# Patient Record
Sex: MALE | Race: OTHER | Employment: FULL TIME | URBAN - METROPOLITAN AREA
[De-identification: names, ages, dates, MRNs, and addresses within clinical notes are randomized per-mention and may not be internally consistent; named-entity substitution may affect disease eponyms.]

---

## 2017-02-15 NOTE — TELEPHONE ENCOUNTER
No future appointments. Due in November    LOV 7/16    LAST LAB    LAST RX   PARoxetine HCl 20 MG Oral Tab 90 tablet 1 7/14/2016     Please advise on refill.

## 2017-02-16 RX ORDER — PAROXETINE HYDROCHLORIDE 20 MG/1
TABLET, FILM COATED ORAL
Qty: 30 TABLET | Refills: 0 | Status: SHIPPED | OUTPATIENT
Start: 2017-02-16 | End: 2017-03-13

## 2017-02-16 NOTE — TELEPHONE ENCOUNTER
Please schedule patient for visit, I sent 30 days until he can be seen, should see us every 6 months for this medicine. Last visit was July.

## 2017-02-16 NOTE — TELEPHONE ENCOUNTER
Spoke to pt. Appt scheduled. Requested labs, explained will be ordered at appt.   Future Appointments  Date Time Provider Amna Hill   3/13/2017 3:30 PM Riri Michel MD [facility] [address]

## 2017-03-01 RX ORDER — LOSARTAN POTASSIUM 50 MG/1
TABLET ORAL
Qty: 30 TABLET | Refills: 0 | Status: SHIPPED | OUTPATIENT
Start: 2017-03-01 | End: 2017-03-13

## 2017-03-01 RX ORDER — LOSARTAN POTASSIUM 50 MG/1
TABLET ORAL
Qty: 90 TABLET | Refills: 0 | OUTPATIENT
Start: 2017-03-01

## 2017-03-01 NOTE — TELEPHONE ENCOUNTER
Future Appointments  Date Time Provider Amna Hill   3/13/2017 3:30 PM Marisabel Espinoza MD [facility] 21 [facility] [address]     LOV 7/16    LAST LAB Lab tests at least by October to November, comp metabolic, lipid & T7K.     LAST RX  Losartan Potassium 50 MG Oral

## 2017-03-13 ENCOUNTER — OFFICE VISIT (OUTPATIENT)
Dept: FAMILY MEDICINE CLINIC | Facility: CLINIC | Age: 47
End: 2017-03-13

## 2017-03-13 VITALS
BODY MASS INDEX: 33.17 KG/M2 | DIASTOLIC BLOOD PRESSURE: 62 MMHG | HEART RATE: 67 BPM | WEIGHT: 213.81 LBS | HEIGHT: 67.5 IN | OXYGEN SATURATION: 97 % | TEMPERATURE: 99 F | RESPIRATION RATE: 16 BRPM | SYSTOLIC BLOOD PRESSURE: 112 MMHG

## 2017-03-13 DIAGNOSIS — E78.2 MIXED HYPERLIPIDEMIA: ICD-10-CM

## 2017-03-13 DIAGNOSIS — F41.0 PANIC DISORDER: ICD-10-CM

## 2017-03-13 DIAGNOSIS — T50.905A WEIGHT GAIN DUE TO MEDICATION: ICD-10-CM

## 2017-03-13 DIAGNOSIS — I10 ESSENTIAL HYPERTENSION: ICD-10-CM

## 2017-03-13 DIAGNOSIS — R63.5 WEIGHT GAIN DUE TO MEDICATION: ICD-10-CM

## 2017-03-13 DIAGNOSIS — R73.9 HYPERGLYCEMIA: ICD-10-CM

## 2017-03-13 DIAGNOSIS — K21.9 GASTROESOPHAGEAL REFLUX DISEASE, ESOPHAGITIS PRESENCE NOT SPECIFIED: ICD-10-CM

## 2017-03-13 DIAGNOSIS — R37 SEXUAL DYSFUNCTION: ICD-10-CM

## 2017-03-13 DIAGNOSIS — Z51.81 MEDICATION MONITORING ENCOUNTER: Primary | ICD-10-CM

## 2017-03-13 PROCEDURE — 99214 OFFICE O/P EST MOD 30 MIN: CPT | Performed by: FAMILY MEDICINE

## 2017-03-13 RX ORDER — ESCITALOPRAM OXALATE 10 MG/1
10 TABLET ORAL DAILY
Qty: 30 TABLET | Refills: 2 | Status: SHIPPED | OUTPATIENT
Start: 2017-03-13 | End: 2017-03-13

## 2017-03-13 RX ORDER — PAROXETINE HYDROCHLORIDE 20 MG/1
TABLET, FILM COATED ORAL
Qty: 30 TABLET | Refills: 0 | Status: SHIPPED | OUTPATIENT
Start: 2017-03-13 | End: 2017-04-25 | Stop reason: ALTCHOICE

## 2017-03-13 RX ORDER — ATORVASTATIN CALCIUM 10 MG/1
10 TABLET, FILM COATED ORAL DAILY
Qty: 30 TABLET | Refills: 6 | Status: SHIPPED | OUTPATIENT
Start: 2017-03-13 | End: 2017-03-13

## 2017-03-13 RX ORDER — LOSARTAN POTASSIUM 50 MG/1
50 TABLET ORAL
Qty: 30 TABLET | Refills: 6 | Status: SHIPPED | OUTPATIENT
Start: 2017-03-13 | End: 2017-10-06

## 2017-03-13 RX ORDER — ALPRAZOLAM 0.5 MG/1
TABLET ORAL NIGHTLY PRN
Qty: 20 TABLET | Refills: 0 | Status: SHIPPED | OUTPATIENT
Start: 2017-03-13 | End: 2018-04-06

## 2017-03-13 NOTE — PROGRESS NOTES
Suni Luis IS A 48year old male [facility] Patient presents with:  Medication Request: refill on all meds       History of present illness:     Is on meds the last month. Has not run out of meds. Anxiety is stable.  No panic attacks, very mild anxiety [DISCONTINUED] PAROXETINE HCL 20 MG Oral Tab TAKE 1 TABLET BY MOUTH EVERY MORNING Disp: 30 tablet Rfl: 0   [DISCONTINUED] Atorvastatin Calcium 10 MG Oral Tab Take 1 tablet (10 mg total) by mouth daily.  Disp: 30 tablet Rfl: 6   [DISCONTINUED] alprazolam (XA Extremities pulses palpable DP & PT, no edema, no varicosities. Skin with no suspicious lesions or rash, warm and dry  Affect normal, no psychomotor retardation, delusions, hallucinations or flight of ideas noted. Test results: none, needs ordered. · Limit drinking alcohol, caffeine, and fizzy beverages. All increase acid reflux. · Try limiting chocolate, peppermint, and spearmint. These can worsen acid reflux in some people. Watch when you eat  · Avoid lying down for 3 hours after eating.   · Do no

## 2017-03-13 NOTE — PATIENT INSTRUCTIONS
Paxil 10 mg daily (half tablet) alternating with 20 mg daily (1 tablet) for 2 weeks,   then 10 mg daily (half tablet) for 2 weeks.   then 10 mg (half tablet) every other day for 2 weeks then stop.     escitalopram 10 mg daily starting when you start a half

## 2017-03-14 RX ORDER — ESCITALOPRAM OXALATE 10 MG/1
TABLET ORAL
Qty: 90 TABLET | Refills: 0 | Status: SHIPPED | OUTPATIENT
Start: 2017-03-14 | End: 2017-06-20

## 2017-03-14 RX ORDER — ATORVASTATIN CALCIUM 10 MG/1
TABLET, FILM COATED ORAL
Qty: 90 TABLET | Refills: 1 | Status: SHIPPED | OUTPATIENT
Start: 2017-03-14 | End: 2017-10-06

## 2017-03-14 NOTE — TELEPHONE ENCOUNTER
Future Appointments  Date Time Provider Amna Liz   5/26/2017 2:30 PM Riri Michel MD [facility] 21 EM[facility] [address]     LOV    LAST LAB 11/15    LAST RX  Atorvastatin Calcium 10 MG Oral Tab 30 tablet 6 3/13/2017     escitalopram 10 MG Oral Tab 30 tablet 2

## 2017-03-14 NOTE — TELEPHONE ENCOUNTER
escitalopram is new, I wanted to try 30 days first to see if tolerated. I'll send 90 days with 1 refill on atorvastatin but he does need labs.

## 2017-03-15 NOTE — TELEPHONE ENCOUNTER
3/13/2017  3:30 PM   Grazyna Bach MD      EM[facility] 21         EMG [address]     Your fasting labs and an appointment are due in order to get another refill.  I gave you one refill the next time will be at your appointment.      Had appointment bu did not

## 2017-03-23 RX ORDER — PAROXETINE HYDROCHLORIDE 20 MG/1
TABLET, FILM COATED ORAL
Qty: 64 TABLET | Refills: 0 | OUTPATIENT
Start: 2017-03-23

## 2017-03-23 NOTE — TELEPHONE ENCOUNTER
Future Appointments  Date Time Provider Amna Hill   5/26/2017 2:30 PM Agatha Tiwari MD [facility] 21 [facility] [address]     LOV     LAST LAB    LAST RX  PARoxetine HCl 20 MG Oral Tab 30 tablet 0 3/13/2017       Sig :  Tapering schedule as directed, 1/2 pill a

## 2017-04-14 ENCOUNTER — TELEPHONE (OUTPATIENT)
Dept: FAMILY MEDICINE CLINIC | Facility: CLINIC | Age: 47
End: 2017-04-14

## 2017-04-14 DIAGNOSIS — E78.2 MIXED HYPERLIPIDEMIA: ICD-10-CM

## 2017-04-14 DIAGNOSIS — R73.01 IMPAIRED FASTING GLUCOSE: ICD-10-CM

## 2017-04-14 DIAGNOSIS — R73.03 PREDIABETES: Primary | ICD-10-CM

## 2017-04-14 DIAGNOSIS — I10 ESSENTIAL HYPERTENSION: ICD-10-CM

## 2017-04-14 DIAGNOSIS — E78.00 HYPERCHOLESTEROLEMIA: ICD-10-CM

## 2017-04-14 NOTE — TELEPHONE ENCOUNTER
Patient called because we ordered labs with [facility] and his insurance requires him to use [facility].  Updated to External.  Please let patient know when labs are ready for [facility].

## 2017-04-14 NOTE — TELEPHONE ENCOUNTER
New orders placed and printed  Pt will need to  at the office as we have no direct computer link to their facilities    Please call pt to  sometime Monday

## 2017-04-24 ENCOUNTER — TELEPHONE (OUTPATIENT)
Dept: FAMILY MEDICINE CLINIC | Facility: CLINIC | Age: 47
End: 2017-04-24

## 2017-04-24 NOTE — TELEPHONE ENCOUNTER
Patient is no longer prediabetic. HA1C 8.5. Needs to be seen sooner. Spoke to patient and gave appointment.      Future Appointments  Date Time Provider Amna Hill   4/25/2017 2:30 PM Marisaebl Espinoza MD [facility] [address]   5/26/2017 2:30 PM Te

## 2017-04-25 ENCOUNTER — OFFICE VISIT (OUTPATIENT)
Dept: FAMILY MEDICINE CLINIC | Facility: CLINIC | Age: 47
End: 2017-04-25

## 2017-04-25 VITALS
TEMPERATURE: 98 F | BODY MASS INDEX: 32.29 KG/M2 | DIASTOLIC BLOOD PRESSURE: 70 MMHG | HEIGHT: 67.75 IN | WEIGHT: 210.63 LBS | RESPIRATION RATE: 16 BRPM | SYSTOLIC BLOOD PRESSURE: 130 MMHG | HEART RATE: 80 BPM | OXYGEN SATURATION: 98 %

## 2017-04-25 DIAGNOSIS — E11.65 UNCONTROLLED TYPE 2 DIABETES MELLITUS WITH HYPERGLYCEMIA, WITHOUT LONG-TERM CURRENT USE OF INSULIN (HCC): Primary | ICD-10-CM

## 2017-04-25 DIAGNOSIS — I10 ESSENTIAL HYPERTENSION: ICD-10-CM

## 2017-04-25 DIAGNOSIS — E78.2 MIXED HYPERLIPIDEMIA: ICD-10-CM

## 2017-04-25 PROCEDURE — 99214 OFFICE O/P EST MOD 30 MIN: CPT | Performed by: FAMILY MEDICINE

## 2017-04-25 RX ORDER — METFORMIN HYDROCHLORIDE 500 MG/1
1000 TABLET, EXTENDED RELEASE ORAL
Qty: 60 TABLET | Refills: 3 | Status: SHIPPED | OUTPATIENT
Start: 2017-04-25 | End: 2017-05-26

## 2017-04-25 NOTE — PATIENT INSTRUCTIONS
Refer to diabetes educator. Goal blood sugar fasting is , goal 2 hours 140-200. You can check 3 days a week, fasting (before breakfast) and 2 hours after a meal.    Increase exercise.   Recommend about 3-4 carb servings per meal and you can also h fast. Fiber is found in fruits, vegetables, whole grains, beans, peas, and many nuts. Carb counting  Keep track of the amount of carbohydrates you eat. This can help you keep the right balance of physical activity and medicine.  The amount of carbohydrates serving size. If you eat more than the listed serving size, you may have to double or triple the other information on the label.   · Check the total grams of carbohydrates. Total carbohydrate from the label includes sugar, starch, and fiber.  Be sure to use lima beans, yams, and squash. Kidney beans, mehta beans, black beans, garbanzo beans, and lentils also contain starches. Sugars  Sugars are found naturally in many foods. Or sugar can be added.  Foods that contain natural sugar include fruits and fruit jui ounces)  · 1 slice of bread  · 1/2 cup of oatmeal  · 1/3 cup of rice  · 4 to 6 crackers  · 1/2 English muffin  · 1/2 cup of black beans  · 1/4 of a large baked potato (3 ounces)  · 2/3 cup of plain fat-free yogurt  · 1 cup of soup  · 1/2 cup of casserole Last Reviewed: 7/1/2016  © 1209-5114 The [address] [place] [address], [address] [place], [place] [address]. All rights reserved. This information is not intended as a substitute for professional medical care.  Always follow your healthcare professional's ins

## 2017-04-25 NOTE — PROGRESS NOTES
Kevin Selby IS A 48year old male [address] [place] Patient presents with:  Test Results: F/U on test results       GAD7-1       History of present illness:     Not much increased water intake or thirst. 1.5 c coffee/day. No increased urination.     Does have hig Years of Education: N/A  Number of Children: 0     Occupational History  on-site management       Social History Main Topics   Smoking status: Former Smoker     Smokeless tobacco: Never Used    Alcohol Use: Yes  0.0 oz/week    0 Standard drinks or equival Uncontrolled type 2 diabetes mellitus with hyperglycemia, without long-term current use of insulin (HCC)  -     OP REFERRAL NEW ONSET DIABETES- NO PREV EDUCATION 10 HRS (EDUCATION)    Other orders  -     MetFORMIN HCl  MG Oral Tablet 24 Hr;  Take 2 ta Sugars are found naturally in many foods. Or sugar can be added. Foods that contain natural sugar include fruits and fruit juices, dairy products, honey, and molasses.  Added sugars are found in most desserts, processed foods, candy, regular soda, and fruit · 1/4 of a large baked potato (3 ounces)  · 2/3 cup of plain fat-free yogurt  · 1 cup of soup  · 1/2 cup of casserole  · 6 chicken nuggets  · 2-inch-square brownie or cake without frosting  · 2 small cookies  · 1/2 cup of ice cream or sherbet  Carb countin © 1265-1140 [address], [address] [place]. All rights reserved. This information is not intended as a substitute for professional medical care. Always follow your healthcare professional's instructions.         Debbie Garcia Keep track of the amount of carbohydrates you eat. This can help you keep the right balance of physical activity and medicine. The amount of carbohydrates needed will vary for each person.  It depends on many things such as your health, the medicines you ta · Check the serving size. The information on the label is based on that serving size. If you eat more than the listed serving size, you may have to double or triple the other information on the label.   · Check the total grams of carbohydrates.  Total Moraima

## 2017-04-26 ENCOUNTER — MED REC SCAN ONLY (OUTPATIENT)
Dept: FAMILY MEDICINE CLINIC | Facility: CLINIC | Age: 47
End: 2017-04-26

## 2017-04-27 ENCOUNTER — TELEPHONE (OUTPATIENT)
Dept: FAMILY MEDICINE CLINIC | Facility: CLINIC | Age: 47
End: 2017-04-27

## 2017-04-27 NOTE — TELEPHONE ENCOUNTER
RE:  Diabetes. Has plans to go to [facility].  Wants to know if he should come see the doctor before going. What should he do while out of the country? Sent to Triage .

## 2017-04-27 NOTE — TELEPHONE ENCOUNTER
Future Appointments  Date Time Provider Amna Liz   5/26/2017 2:30 PM Kaiser Knott MD [facility] 21 [facility] [address]

## 2017-04-27 NOTE — TELEPHONE ENCOUNTER
Patient concerned about going on vacation with new Dx Diabetes. Says he wont be able to see dietician before he leaves. Spoke to patient advised to call for Diabetic education appointment perhaps on his lunch.  Need to eat in moderation but should go on

## 2017-04-28 RX ORDER — ESCITALOPRAM OXALATE 10 MG/1
TABLET ORAL
Qty: 90 TABLET | Refills: 2 | OUTPATIENT
Start: 2017-04-28

## 2017-04-28 NOTE — TELEPHONE ENCOUNTER
Future Appointments  Date Time Provider Amna Hill   5/3/2017 2:00 PM Nereida Cohen RN,CDE [id number]   5/26/2017 2:30 PM Kael Fernandez MD [facility] 21 [facility] [address]       LOV    LAST LAB    LAST RX   ESCITALOPRAM 10 MG Oral Tab 90 ta

## 2017-05-02 NOTE — TELEPHONE ENCOUNTER
Patient called stating he feels he should cancel his vacation due to being a newly diagnosed DM. He needs a letter from his physician so he can cancel the flight. Please advise.

## 2017-05-02 NOTE — TELEPHONE ENCOUNTER
Spoke with pt, advised that Dr Belem Martinez is out of the office for 2 more weeks    Also that the Dr did not recommend he cancel his vacation    Pt can ask the diabetic educator tomorrow if she will write a letter because he is choosing to cancel this vacation

## 2017-05-03 ENCOUNTER — NURSE ONLY (OUTPATIENT)
Dept: ENDOCRINOLOGY CLINIC | Facility: CLINIC | Age: 47
End: 2017-05-03

## 2017-05-03 VITALS — BODY MASS INDEX: 31 KG/M2 | SYSTOLIC BLOOD PRESSURE: 134 MMHG | DIASTOLIC BLOOD PRESSURE: 80 MMHG | WEIGHT: 205 LBS

## 2017-05-03 DIAGNOSIS — E11.65 UNCONTROLLED TYPE 2 DIABETES MELLITUS WITH HYPERGLYCEMIA, WITHOUT LONG-TERM CURRENT USE OF INSULIN (HCC): Primary | ICD-10-CM

## 2017-05-03 PROCEDURE — G0108 DIAB MANAGE TRN  PER INDIV: HCPCS | Performed by: DIETITIAN, REGISTERED

## 2017-05-04 RX ORDER — BLOOD SUGAR DIAGNOSTIC
STRIP MISCELLANEOUS
Qty: 300 STRIP | Refills: 3 | Status: SHIPPED | OUTPATIENT
Start: 2017-05-04 | End: 2018-04-06

## 2017-05-04 NOTE — PROGRESS NOTES
Yecenia Brown  : 1970 attended Step 1 Diabetic Education:    Date: 5/3/2017   Start time: 2pm End time: 3:30pm    Wt 205 lb      HGBA1C (%)   Date Value   2015 6.2*   ----------     Depression Screen - PHQ-2 SCORE: 1    Diabetes Overview, pat has no further questions at this time.     Dimple Vernon, HALIMA,CDE

## 2017-05-10 ENCOUNTER — TELEPHONE (OUTPATIENT)
Dept: FAMILY MEDICINE CLINIC | Facility: CLINIC | Age: 47
End: 2017-05-10

## 2017-05-10 NOTE — TELEPHONE ENCOUNTER
70-80 2 hours after meal? yes after dinner. How does he feel? He feels shaky when blood sugar is so low.      Monitoring: Instructed/reinforced blood glucose monitoring, testing schedules and target goals:              Fasting / Pre-meal           2 H

## 2017-05-10 NOTE — TELEPHONE ENCOUNTER
Patient called stating he is taking medication for DM and watching what he eats. He says his sugar is around 70-80 after meals. Is this normal?  Transferred to triage line.

## 2017-05-11 ENCOUNTER — TELEPHONE (OUTPATIENT)
Dept: INTERNAL MEDICINE CLINIC | Facility: CLINIC | Age: 47
End: 2017-05-11

## 2017-05-12 NOTE — TELEPHONE ENCOUNTER
Patient called to say he is concerned about his Metformin bringing his sugar too low. Patient recently diagnosed and was instructed to take Metformin ER 500mg once daily for a couple of weeks and then increase if tolerated.  Patient has been taking once sadiq

## 2017-05-17 ENCOUNTER — TELEPHONE (OUTPATIENT)
Dept: FAMILY MEDICINE CLINIC | Facility: CLINIC | Age: 47
End: 2017-05-17

## 2017-05-17 NOTE — TELEPHONE ENCOUNTER
Patient has changed his diet drastically. Not taking any DM Rx per on call dr. Hernandez Needle call patient if there is a cancellation. BS below 100 fasting. after meals 130-140. Not taking any Rx  Will f/u at appointment.      Future Appointments  Date Time Pr

## 2017-05-17 NOTE — TELEPHONE ENCOUNTER
Has Appt already for medication. Having concerns about blood sugars. Would like to move appt sooner. Nothing offered, need Triage advice.   Future Appointments  Date Time Provider Amna Hill   5/26/2017 2:30 PM Jose Cohen MD EM[facility] 21 EM[facility] Rt

## 2017-05-26 ENCOUNTER — OFFICE VISIT (OUTPATIENT)
Dept: FAMILY MEDICINE CLINIC | Facility: CLINIC | Age: 47
End: 2017-05-26

## 2017-05-26 VITALS
HEART RATE: 75 BPM | OXYGEN SATURATION: 97 % | TEMPERATURE: 98 F | SYSTOLIC BLOOD PRESSURE: 134 MMHG | HEIGHT: 66.5 IN | RESPIRATION RATE: 16 BRPM | WEIGHT: 202 LBS | BODY MASS INDEX: 32.08 KG/M2 | DIASTOLIC BLOOD PRESSURE: 84 MMHG

## 2017-05-26 DIAGNOSIS — E11.9 TYPE 2 DIABETES MELLITUS WITHOUT COMPLICATION, WITHOUT LONG-TERM CURRENT USE OF INSULIN (HCC): Primary | ICD-10-CM

## 2017-05-26 PROCEDURE — 99213 OFFICE O/P EST LOW 20 MIN: CPT | Performed by: FAMILY MEDICINE

## 2017-05-26 NOTE — PROGRESS NOTES
Ana Collins IS A 50year old male HERE FOR Patient presents with:  Diabetes: 2 mon F/U        History of present illness:     No sugar, no processed starches (eliminated white bread, has some whole grain bread), more fruit & veggies.      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 \"what are calories? Where do they come from? \" I discussed basic types of foods: protein, fat, carbs, that calories are the energy content of food and are calc Carbohydrates are the main source of fuel for the body. Carbohydrates raise blood sugar. Many people think carbohydrates are only found in pasta or bread.  But carbohydrates are actually in many kinds of foods:  · Sugars occur naturally in foods such as fru · Saturated fats are found in animal products, such as meat, poultry, whole milk, lard, and butter. Saturated fats raise LDL cholesterol and are not healthy for your heart.   · Hydrogenated oils and trans fats are formed when vegetable oils are processed in A meal plan gives guidelines for the types and amounts of food you should eat. The goal is to balance food and insulin (or other diabetes medications) so your blood sugars will be in your target range.  Your dietitian will help you make a flexible meal plan This includes most fruit juices, which are often high in natural or added sugar. Instead, drink plenty of water and other sugar-free beverages. Eat less fat  If you need to lose weight, try to reduce the amount of fat in your diet.  This can also help lowe · Tortillas Choose low-fat or fat-free milk, yogurt, or cheese each day.   Good choices include:  · Low-fat or fat-free milk or chocolate milk  · Low-fat or fat-free yogurt  · Low-fat or fat-free cottage cheese or other reduced-fat cheeses  · Calcium-fortif 1 medium piece of fruit  1 cup of berries or melon  ½ cup dried fruit  ½ cup 100% fruit juice  Note: Make most choices fruit instead of juice.     Grains 6 Servings or 6 Ounces  One serving is:  1 slice bread  1 cup dry cereal  ½ cup cooked rice, pasta, or · Vegetables. Any vegetable or 100% vegetable juice counts as a member of the Vegetable Group. Vegetables may be fresh, frozen, canned, or dried. They can be served raw or cooked and may be whole, cut-up, or mashed.  Make half your plate fruits and vegetabl Date Last Reviewed: 6/1/2015  © 8674-9127 The [address] [place] [address], [address] [place], [place] [address]. All rights reserved. This information is not intended as a substitute for professional medical care.  Always follow your healthcare professional' · Limit the extras (solid fats and sugars, also called \"empty calories\") to 130 calories a day. · Cut back on salt (sodium). Stay under 2,300 mg sodium a day.  If you have a health condition such as heart disease or high blood pressure, your doctor will 1 tablespoon peanut or almond butter  ¼ cup cooked dry beans or peas  ½ cup tofu  2 tablespoons hummus     Date Last Reviewed: 6/1/2015  © 6713-9888 [address]. All rights reserved.  This information

## 2017-05-26 NOTE — PATIENT INSTRUCTIONS
Fasting sugar goal  (you are  most of the time now). Normal is under 100. Prediabetic 100-125, Diabetic over 125.    2 hours after meal goal 140-180. (you are running 100s-130s) Normal is under 180. Diabetic over 200.     Continue current diet & needed. Fat does not raise blood sugar. However, it can raise blood cholesterol, increasing the risk of heart disease. Fat is also high in calories, which can cause weight gain. Not all types of fat are the same.   More Healthy:  · Monounsaturated fats are [address] [facility], [address] [place] [address]. All rights reserved. This information is not intended as a substitute for professional medical care. Always follow your healthcare professional's instructions.         Diabetes: Meal Planning disease. When you have diabetes, it’s important to control your weight and protect your heart. Foods that are high in fat include whole milk, cheese, snack foods, and desserts. · Protein is important for building and repairing muscles and bones.  Choose lo amounts each week:  · 1½ cups dark green vegetables  · 5½ cups red or orange-colored vegetables  · 1½ cups dry beans and peas  · 5 cups starchy vegetables  · 4 cups other vegetables Eat a variety of fruits each day. Go easy on fruit juices.   Good choices guide as you plan your meals throughout the day. Track your progress daily by writing in what you actually ate.   Food Group  Daily MyPlate Goal  What You Ate Today    Vegetables 5 Half-cups or 5 Servings  One serving is:  ½ cup cut-up raw or cooked vegetab think about the healthiest choices for what to put onto your plate or into your cup or bowl. To learn more about building a healthy plate, visit www. choosemyplate.gov. The food groups  · Fruits.  Any fruit or 100% fruit juice counts as part of the Fruit individual foods to see or compare their nutritional value. You can get guidelines for what and how much you should eat. You can compare your food choices. And you can assess personal physical activities and see ways you can improve. Go to www. [employer] peas  · Tofu  · Unsalted nuts and seeds  Choose less high-fat and red meat. Source: [employer], www.choosemyplate. gov  Know your limits on oils (fats) and sugars:  · Your allowance for oils is 24 grams or about 5 teaspoons a day (oil includes vegetable processed cheese  1 cup low-fat yogurt  1/3 cup shredded cheese  Note: Choose low-fat or fat-free most often.     Protein 5 Servings or 5 Ounces  One serving is:  1 ounce cooked lean beef, pork, lamb, or ham  1 ounce cooked chicken or turkey (no skin)  1 o

## 2017-06-20 RX ORDER — ESCITALOPRAM OXALATE 10 MG/1
10 TABLET ORAL
Qty: 90 TABLET | Refills: 0 | Status: SHIPPED | OUTPATIENT
Start: 2017-06-20 | End: 2017-07-31

## 2017-06-20 NOTE — TELEPHONE ENCOUNTER
No future appointments. LOV 5/17    LAST LAB 4/17     LAST RX   metoprolol Tartrate 25 MG Oral Tab 180 tablet 0 6/19/2017       Please advise does not meet protocol. If filled please close.   Thank you

## 2017-07-06 ENCOUNTER — APPOINTMENT (OUTPATIENT)
Dept: LAB | Age: 47
End: 2017-07-06
Attending: FAMILY MEDICINE
Payer: COMMERCIAL

## 2017-07-06 DIAGNOSIS — E11.9 TYPE 2 DIABETES MELLITUS WITHOUT COMPLICATION, WITHOUT LONG-TERM CURRENT USE OF INSULIN (HCC): ICD-10-CM

## 2017-07-06 LAB
EST. AVERAGE GLUCOSE BLD GHB EST-MCNC: 131 MG/DL (ref 68–126)
HBA1C MFR BLD HPLC: 6.2 % (ref ?–5.7)

## 2017-07-06 PROCEDURE — 36415 COLL VENOUS BLD VENIPUNCTURE: CPT

## 2017-07-06 PROCEDURE — 83036 HEMOGLOBIN GLYCOSYLATED A1C: CPT

## 2017-07-07 ENCOUNTER — TELEPHONE (OUTPATIENT)
Dept: FAMILY MEDICINE CLINIC | Facility: CLINIC | Age: 47
End: 2017-07-07

## 2017-07-07 DIAGNOSIS — I10 ESSENTIAL HYPERTENSION: ICD-10-CM

## 2017-07-07 DIAGNOSIS — E11.9 DIET-CONTROLLED TYPE 2 DIABETES MELLITUS (HCC): Primary | ICD-10-CM

## 2017-07-07 DIAGNOSIS — E78.2 MIXED HYPERLIPIDEMIA: ICD-10-CM

## 2017-07-07 NOTE — PROGRESS NOTES
Lab ordered, patient uses external lab (in past, although I think order may be placed with [facility]). Contact pt to ask if he needs a paper copy of lab order sent to him.

## 2017-07-12 ENCOUNTER — LAB ENCOUNTER (OUTPATIENT)
Dept: LAB | Age: 47
End: 2017-07-12
Attending: FAMILY MEDICINE
Payer: COMMERCIAL

## 2017-07-12 DIAGNOSIS — E11.9 DIET-CONTROLLED TYPE 2 DIABETES MELLITUS (HCC): Primary | ICD-10-CM

## 2017-07-12 DIAGNOSIS — I10 ESSENTIAL HYPERTENSION: ICD-10-CM

## 2017-07-12 DIAGNOSIS — E78.2 MIXED HYPERLIPIDEMIA: ICD-10-CM

## 2017-07-12 LAB
ALBUMIN SERPL-MCNC: 3.8 G/DL (ref 3.5–4.8)
ALP LIVER SERPL-CCNC: 74 U/L (ref 45–117)
ALT SERPL-CCNC: 46 U/L (ref 17–63)
AST SERPL-CCNC: 21 U/L (ref 15–41)
BILIRUB SERPL-MCNC: 1.1 MG/DL (ref 0.1–2)
BUN BLD-MCNC: 14 MG/DL (ref 8–20)
CALCIUM BLD-MCNC: 9.2 MG/DL (ref 8.3–10.3)
CHLORIDE: 107 MMOL/L (ref 101–111)
CHOLEST SMN-MCNC: 100 MG/DL (ref ?–200)
CO2: 27 MMOL/L (ref 22–32)
CREAT BLD-MCNC: 0.95 MG/DL (ref 0.7–1.3)
GLUCOSE BLD-MCNC: 93 MG/DL (ref 70–99)
HDLC SERPL-MCNC: 41 MG/DL (ref 45–?)
HDLC SERPL: 2.44 {RATIO} (ref ?–4.97)
LDLC SERPL CALC-MCNC: 36 MG/DL (ref ?–130)
M PROTEIN MFR SERPL ELPH: 7.3 G/DL (ref 6.1–8.3)
NONHDLC SERPL-MCNC: 59 MG/DL (ref ?–130)
POTASSIUM SERPL-SCNC: 4.4 MMOL/L (ref 3.6–5.1)
SODIUM SERPL-SCNC: 140 MMOL/L (ref 136–144)
TRIGLYCERIDES: 116 MG/DL (ref ?–150)
VLDL: 23 MG/DL (ref 5–40)

## 2017-07-12 PROCEDURE — 36415 COLL VENOUS BLD VENIPUNCTURE: CPT

## 2017-07-12 PROCEDURE — 80053 COMPREHEN METABOLIC PANEL: CPT

## 2017-07-12 PROCEDURE — 80061 LIPID PANEL: CPT

## 2017-07-31 ENCOUNTER — TELEPHONE (OUTPATIENT)
Dept: FAMILY MEDICINE CLINIC | Facility: CLINIC | Age: 47
End: 2017-07-31

## 2017-07-31 RX ORDER — ESCITALOPRAM OXALATE 10 MG/1
10 TABLET ORAL
Qty: 90 TABLET | Refills: 0 | Status: SHIPPED | OUTPATIENT
Start: 2017-07-31 | End: 2017-07-31

## 2017-07-31 RX ORDER — ESCITALOPRAM OXALATE 10 MG/1
15 TABLET ORAL
Qty: 135 TABLET | Refills: 0 | Status: SHIPPED | OUTPATIENT
Start: 2017-07-31 | End: 2017-10-06

## 2017-09-06 ENCOUNTER — TELEPHONE (OUTPATIENT)
Dept: FAMILY MEDICINE CLINIC | Facility: CLINIC | Age: 47
End: 2017-09-06

## 2017-09-06 NOTE — TELEPHONE ENCOUNTER
Can we please put in Labs Order for Diabetes Check. Pt scheduled an appt. For Labs Review/Diabetic Check.      Future Appointments  Date Time Provider Amna Hill   10/6/2017 2:30 PM Suellen Coelho MD EMG 21 EMG [address]

## 2017-10-03 ENCOUNTER — APPOINTMENT (OUTPATIENT)
Dept: LAB | Age: 47
End: 2017-10-03
Attending: FAMILY MEDICINE
Payer: COMMERCIAL

## 2017-10-03 DIAGNOSIS — E78.2 MIXED HYPERLIPIDEMIA: ICD-10-CM

## 2017-10-03 DIAGNOSIS — E11.9 DIET-CONTROLLED TYPE 2 DIABETES MELLITUS (HCC): ICD-10-CM

## 2017-10-03 DIAGNOSIS — I10 ESSENTIAL HYPERTENSION: ICD-10-CM

## 2017-10-03 PROCEDURE — 82043 UR ALBUMIN QUANTITATIVE: CPT

## 2017-10-03 PROCEDURE — 82570 ASSAY OF URINE CREATININE: CPT

## 2017-10-03 PROCEDURE — 36415 COLL VENOUS BLD VENIPUNCTURE: CPT

## 2017-10-03 PROCEDURE — 83036 HEMOGLOBIN GLYCOSYLATED A1C: CPT

## 2017-10-06 ENCOUNTER — OFFICE VISIT (OUTPATIENT)
Dept: FAMILY MEDICINE CLINIC | Facility: CLINIC | Age: 47
End: 2017-10-06

## 2017-10-06 VITALS
DIASTOLIC BLOOD PRESSURE: 80 MMHG | BODY MASS INDEX: 30.67 KG/M2 | HEART RATE: 72 BPM | WEIGHT: 200 LBS | HEIGHT: 67.75 IN | SYSTOLIC BLOOD PRESSURE: 122 MMHG

## 2017-10-06 DIAGNOSIS — E11.65 TYPE 2 DIABETES MELLITUS WITH HYPERGLYCEMIA, WITHOUT LONG-TERM CURRENT USE OF INSULIN (HCC): ICD-10-CM

## 2017-10-06 DIAGNOSIS — I10 ESSENTIAL HYPERTENSION: ICD-10-CM

## 2017-10-06 DIAGNOSIS — E78.2 MIXED HYPERLIPIDEMIA: ICD-10-CM

## 2017-10-06 DIAGNOSIS — Z23 NEED FOR VACCINATION: ICD-10-CM

## 2017-10-06 DIAGNOSIS — Z51.81 MEDICATION MONITORING ENCOUNTER: Primary | ICD-10-CM

## 2017-10-06 DIAGNOSIS — F41.9 ANXIETY: ICD-10-CM

## 2017-10-06 PROCEDURE — 90686 IIV4 VACC NO PRSV 0.5 ML IM: CPT | Performed by: FAMILY MEDICINE

## 2017-10-06 PROCEDURE — 90471 IMMUNIZATION ADMIN: CPT | Performed by: FAMILY MEDICINE

## 2017-10-06 PROCEDURE — 99214 OFFICE O/P EST MOD 30 MIN: CPT | Performed by: FAMILY MEDICINE

## 2017-10-06 RX ORDER — ATORVASTATIN CALCIUM 10 MG/1
TABLET, FILM COATED ORAL
Qty: 45 TABLET | Refills: 1 | Status: SHIPPED | OUTPATIENT
Start: 2017-10-06 | End: 2018-04-06

## 2017-10-06 RX ORDER — ESCITALOPRAM OXALATE 20 MG/1
20 TABLET ORAL
Qty: 90 TABLET | Refills: 1 | Status: SHIPPED | OUTPATIENT
Start: 2017-10-06 | End: 2018-04-06

## 2017-10-06 RX ORDER — LOSARTAN POTASSIUM 50 MG/1
50 TABLET ORAL
Qty: 90 TABLET | Refills: 1 | Status: SHIPPED | OUTPATIENT
Start: 2017-10-06 | End: 2018-04-06

## 2017-10-06 NOTE — PROGRESS NOTES
Diana Paiz IS A 50year old male [address] [facility] [place] Patient presents with:  Diabetes  Lab Results       History of present illness: On 20 mg escitalopram is functioning best with anxiety.  At 15 mg better than 10 mg but still had borderline edge of panic magalys • Lipids Mother    • High Cholesterol Brother    • Arthritis Brother    • Hypotension[other] [OTHER] Father      diet & exercise; no contact since 2000       Social history:         Social History  Social History   Marital status: Single  Spouse name: N/ hypertension    Type 2 diabetes mellitus with hyperglycemia, without long-term current use of insulin (HCC)    Anxiety    Need for vaccination  -     FLULAVAL INFLUENZA VACCINE QUAD PRESERVATIVE FREE 0.5 ML    Other orders  -     Losartan Potassium 50 MG O

## 2017-10-06 NOTE — PATIENT INSTRUCTIONS
Recheck for meds for diabetes & anxiety in 6 months  Decrease atorvastatin to 10 mg 3 days a week. escitalopram 20 mg tablets, 1 pill daily. Continue losartan the same 50 mg daily.     Flu shot suggested     Recheck in 6 months, consider colonoscopy, PSA

## 2018-01-25 ENCOUNTER — TELEPHONE (OUTPATIENT)
Dept: FAMILY MEDICINE CLINIC | Facility: CLINIC | Age: 48
End: 2018-01-25

## 2018-01-25 DIAGNOSIS — E11.65 TYPE 2 DIABETES MELLITUS WITH HYPERGLYCEMIA, WITHOUT LONG-TERM CURRENT USE OF INSULIN (HCC): Primary | ICD-10-CM

## 2018-01-25 NOTE — TELEPHONE ENCOUNTER
Recheck for meds for diabetes & anxiety in 6 months. April     Spoke to patient and rescheduled. Labs ordered.    Future Appointments  Date Time Provider Amna Hill   4/6/2018 2:30 PM Latasha Antoine MD EMG 21 EMG [address]

## 2018-01-25 NOTE — TELEPHONE ENCOUNTER
Scheduled a follow-up appt for diabetes check. Wants to know if Dr Israel Braun can order LABS prior to appt so that she will have results when he comes in. Please call.   Future Appointments  Date Time Provider Amna Hill   2/5/2018 3:00 PM Pector,

## 2018-03-29 ENCOUNTER — APPOINTMENT (OUTPATIENT)
Dept: LAB | Age: 48
End: 2018-03-29
Attending: FAMILY MEDICINE
Payer: COMMERCIAL

## 2018-03-29 DIAGNOSIS — E11.65 TYPE 2 DIABETES MELLITUS WITH HYPERGLYCEMIA, WITHOUT LONG-TERM CURRENT USE OF INSULIN (HCC): ICD-10-CM

## 2018-03-29 PROCEDURE — 80053 COMPREHEN METABOLIC PANEL: CPT

## 2018-03-29 PROCEDURE — 36415 COLL VENOUS BLD VENIPUNCTURE: CPT

## 2018-03-29 PROCEDURE — 83036 HEMOGLOBIN GLYCOSYLATED A1C: CPT

## 2018-04-06 ENCOUNTER — OFFICE VISIT (OUTPATIENT)
Dept: FAMILY MEDICINE CLINIC | Facility: CLINIC | Age: 48
End: 2018-04-06

## 2018-04-06 VITALS
TEMPERATURE: 99 F | BODY MASS INDEX: 31.09 KG/M2 | WEIGHT: 202.81 LBS | HEIGHT: 67.75 IN | RESPIRATION RATE: 16 BRPM | DIASTOLIC BLOOD PRESSURE: 80 MMHG | SYSTOLIC BLOOD PRESSURE: 126 MMHG | HEART RATE: 72 BPM | OXYGEN SATURATION: 97 %

## 2018-04-06 DIAGNOSIS — E78.2 MIXED HYPERLIPIDEMIA: ICD-10-CM

## 2018-04-06 DIAGNOSIS — R73.01 IMPAIRED FASTING GLUCOSE: Primary | ICD-10-CM

## 2018-04-06 DIAGNOSIS — I10 ESSENTIAL HYPERTENSION: ICD-10-CM

## 2018-04-06 PROCEDURE — 99213 OFFICE O/P EST LOW 20 MIN: CPT | Performed by: FAMILY MEDICINE

## 2018-04-06 RX ORDER — LOSARTAN POTASSIUM 50 MG/1
50 TABLET ORAL
Qty: 90 TABLET | Refills: 1 | Status: SHIPPED | OUTPATIENT
Start: 2018-04-06 | End: 2018-10-09

## 2018-04-06 RX ORDER — ATORVASTATIN CALCIUM 10 MG/1
TABLET, FILM COATED ORAL
Qty: 45 TABLET | Refills: 1 | Status: SHIPPED | OUTPATIENT
Start: 2018-04-06 | End: 2018-10-05

## 2018-04-06 RX ORDER — ALPRAZOLAM 0.5 MG/1
TABLET ORAL NIGHTLY PRN
Qty: 20 TABLET | Refills: 0 | Status: SHIPPED | OUTPATIENT
Start: 2018-04-06 | End: 2019-05-02

## 2018-04-06 RX ORDER — BLOOD SUGAR DIAGNOSTIC
STRIP MISCELLANEOUS
Qty: 300 STRIP | Refills: 3 | Status: SHIPPED | OUTPATIENT
Start: 2018-04-06 | End: 2019-04-18

## 2018-04-06 RX ORDER — ESCITALOPRAM OXALATE 20 MG/1
20 TABLET ORAL
Qty: 90 TABLET | Refills: 1 | Status: SHIPPED | OUTPATIENT
Start: 2018-04-06 | End: 2018-10-05

## 2018-04-06 NOTE — PATIENT INSTRUCTIONS
You're doing well with labs, increase activity as tolerated. Enjoy [place]! Consider Xanax on plane en route to [place] to help sleep on plane. Otherwise avoid alcohol and Xanax together, continue your usual medications and recheck in 6 months.

## 2018-04-06 NOTE — PROGRESS NOTES
Ian Baeza IS A 50year old male HERE FOR Patient presents with:  Diabetes: Lab results        History of present illness:     Plans 7 countries in [place] in a month with girlfriend. Not checking BP. Eating healthy. Not exercising much.      Wilmer Branch • Arthritis Brother    • Hypotension[other] [OTHER] Father      diet & exercise; no contact since 2000       Social history:         Social History  Social History   Marital status: Single  Spouse name: N/A    Years of education: N/A  Number of children: 03/29/2018 3.5    • Sodium 03/29/2018 137    • Potassium 03/29/2018 4.1    • Chloride 03/29/2018 104    • CO2 03/29/2018 23.0    • HgbA1C 03/29/2018 6.1*   • Estimated Average Glucose 03/29/2018 128*           Assessment & Plan:   Jessica Ross was seen today fo

## 2018-04-11 RX ORDER — LOSARTAN POTASSIUM 50 MG/1
TABLET ORAL
Qty: 90 TABLET | Refills: 1 | OUTPATIENT
Start: 2018-04-11

## 2018-04-11 RX ORDER — ESCITALOPRAM OXALATE 20 MG/1
TABLET ORAL
Qty: 90 TABLET | Refills: 1 | OUTPATIENT
Start: 2018-04-11

## 2018-04-11 NOTE — TELEPHONE ENCOUNTER
LOV    LAST LAB    LAST RX   Losartan Potassium 50 MG Oral Tab 90 tablet 1 4/6/2018       PROTOCOL  Hypertension Medications Protocol Passed    Rx denied already done.

## 2018-04-20 ENCOUNTER — TELEPHONE (OUTPATIENT)
Dept: FAMILY MEDICINE CLINIC | Facility: CLINIC | Age: 48
End: 2018-04-20

## 2018-04-20 DIAGNOSIS — E11.65 TYPE 2 DIABETES MELLITUS WITH HYPERGLYCEMIA, WITHOUT LONG-TERM CURRENT USE OF INSULIN (HCC): Primary | ICD-10-CM

## 2018-04-20 NOTE — TELEPHONE ENCOUNTER
Referral placed.      Patient Name: Pedro Pablo Burnett   : 66   Reason for the order/referral: Diabetic eye exam   PCP: Jose Beltran,   Refer to Provider (first and last name): / Gregory Mckee   Specialty: Ophthalomology   Patient Insurance: Payor

## 2018-10-10 RX ORDER — ESCITALOPRAM OXALATE 20 MG/1
TABLET ORAL
Qty: 30 TABLET | Refills: 0 | Status: SHIPPED | OUTPATIENT
Start: 2018-10-10 | End: 2018-10-23

## 2018-10-10 RX ORDER — ATORVASTATIN CALCIUM 10 MG/1
TABLET, FILM COATED ORAL
Qty: 12 TABLET | Refills: 0 | Status: SHIPPED | OUTPATIENT
Start: 2018-10-10 | End: 2018-10-23

## 2018-10-10 NOTE — TELEPHONE ENCOUNTER
LOV 4/18    LAST LAB 7/17    LAST RX   atorvastatin 10 MG Oral Tab 45 tablet 1 4/6/2018         Next OV Visit date not found    PROTOCOL    Cholesterol Medication Protocol Ozbpcb20/5 9:58 PM   Lipid panel within past 12 months     Refilled x 30 days need

## 2018-10-11 RX ORDER — LOSARTAN POTASSIUM 50 MG/1
TABLET ORAL
Qty: 30 TABLET | Refills: 0 | Status: SHIPPED | OUTPATIENT
Start: 2018-10-11 | End: 2018-10-23

## 2018-10-11 NOTE — TELEPHONE ENCOUNTER
LOV    LAST LAB 3/18 Due now.      LAST RX   Losartan Potassium 50 MG Oral Tab 90 tablet 1 4/6/2018         Next OV 10/23/2018    PROTOCOL    Hypertension Medications Protocol [id number] 6:11 PM   Appointment in past 6 or next 3 months     Refilled x 30 d

## 2018-10-12 RX ORDER — ESCITALOPRAM OXALATE 20 MG/1
TABLET ORAL
Qty: 90 TABLET | Refills: 0 | OUTPATIENT
Start: 2018-10-12

## 2018-10-12 RX ORDER — LOSARTAN POTASSIUM 50 MG/1
TABLET ORAL
Qty: 90 TABLET | Refills: 0 | OUTPATIENT
Start: 2018-10-12

## 2018-10-12 NOTE — TELEPHONE ENCOUNTER
Name from pharmacy: LOSARTAN 50MG TABLETS          Will file in chart as: LOSARTAN POTASSIUM 50 MG Oral Tab    Sig: TAKE 1 TABLET BY MOUTH EVERY DAY    Disp:  90 tablet    Refills:  0    Start: 10/11/2018     Class: Normal    To pharmacy: **Patient request

## 2018-10-12 NOTE — TELEPHONE ENCOUNTER
LOV    LAST LAB    LAST RX   ESCITALOPRAM 20 MG Oral Tab 30 tablet 0 10/10/2018         Next OV 10/23/2018      Rx denied already done.

## 2018-10-18 ENCOUNTER — APPOINTMENT (OUTPATIENT)
Dept: LAB | Age: 48
End: 2018-10-18
Attending: FAMILY MEDICINE
Payer: COMMERCIAL

## 2018-10-18 DIAGNOSIS — E78.2 MIXED HYPERLIPIDEMIA: ICD-10-CM

## 2018-10-18 DIAGNOSIS — I10 ESSENTIAL HYPERTENSION: ICD-10-CM

## 2018-10-18 DIAGNOSIS — R73.01 IMPAIRED FASTING GLUCOSE: ICD-10-CM

## 2018-10-18 PROCEDURE — 80053 COMPREHEN METABOLIC PANEL: CPT

## 2018-10-18 PROCEDURE — 36415 COLL VENOUS BLD VENIPUNCTURE: CPT

## 2018-10-18 PROCEDURE — 83036 HEMOGLOBIN GLYCOSYLATED A1C: CPT

## 2018-10-18 PROCEDURE — 80061 LIPID PANEL: CPT

## 2018-10-23 ENCOUNTER — OFFICE VISIT (OUTPATIENT)
Dept: FAMILY MEDICINE CLINIC | Facility: CLINIC | Age: 48
End: 2018-10-23
Payer: COMMERCIAL

## 2018-10-23 VITALS
RESPIRATION RATE: 17 BRPM | WEIGHT: 208.38 LBS | HEART RATE: 67 BPM | TEMPERATURE: 99 F | HEIGHT: 67.75 IN | OXYGEN SATURATION: 97 % | BODY MASS INDEX: 31.95 KG/M2 | DIASTOLIC BLOOD PRESSURE: 80 MMHG | SYSTOLIC BLOOD PRESSURE: 122 MMHG

## 2018-10-23 DIAGNOSIS — I10 ESSENTIAL HYPERTENSION: ICD-10-CM

## 2018-10-23 DIAGNOSIS — F41.9 ANXIETY: ICD-10-CM

## 2018-10-23 DIAGNOSIS — Z13.0 SCREENING FOR DEFICIENCY ANEMIA: ICD-10-CM

## 2018-10-23 DIAGNOSIS — Z12.5 ENCOUNTER FOR PROSTATE CANCER SCREENING: ICD-10-CM

## 2018-10-23 DIAGNOSIS — E78.2 MIXED HYPERLIPIDEMIA: ICD-10-CM

## 2018-10-23 DIAGNOSIS — Z12.11 ENCOUNTER FOR SCREENING COLONOSCOPY: ICD-10-CM

## 2018-10-23 DIAGNOSIS — Z13.29 SCREENING FOR THYROID DISORDER: ICD-10-CM

## 2018-10-23 DIAGNOSIS — R73.03 PREDIABETES: Primary | ICD-10-CM

## 2018-10-23 DIAGNOSIS — Z23 NEED FOR VACCINATION: ICD-10-CM

## 2018-10-23 PROCEDURE — 90686 IIV4 VACC NO PRSV 0.5 ML IM: CPT | Performed by: FAMILY MEDICINE

## 2018-10-23 PROCEDURE — 90471 IMMUNIZATION ADMIN: CPT | Performed by: FAMILY MEDICINE

## 2018-10-23 PROCEDURE — 99214 OFFICE O/P EST MOD 30 MIN: CPT | Performed by: FAMILY MEDICINE

## 2018-10-23 RX ORDER — LOSARTAN POTASSIUM 50 MG/1
50 TABLET ORAL
Qty: 30 TABLET | Refills: 6 | Status: SHIPPED | OUTPATIENT
Start: 2018-10-23 | End: 2019-04-08

## 2018-10-23 RX ORDER — ATORVASTATIN CALCIUM 10 MG/1
TABLET, FILM COATED ORAL
Qty: 12 TABLET | Refills: 6 | Status: SHIPPED | OUTPATIENT
Start: 2018-10-23 | End: 2019-04-18

## 2018-10-23 RX ORDER — ESCITALOPRAM OXALATE 20 MG/1
20 TABLET ORAL
Qty: 30 TABLET | Refills: 6 | Status: SHIPPED | OUTPATIENT
Start: 2018-10-23 | End: 2019-05-02

## 2018-10-23 NOTE — PATIENT INSTRUCTIONS
Refer for colonoscopy. Ordered lab for PSA (prostate blood test) and CBC, TSH (blood count and thyroid) for physical in 1-2 months, and recheck diabetes in 6 months.

## 2018-10-23 NOTE — PROGRESS NOTES
Hu Jennings IS A 46year old male HERE FOR Patient presents with:  Diabetes: Medication refills        History of present illness:     Needs 47 y/o well exam. No Fhx colon ca or prostate ca. Brother had colonoscopy. Pt willing to pursue that.      No imp Maternal Aunt    • Diabetes Maternal Aunt    • Diabetes Other         aunts,uncles   • High Cholesterol Mother    • Lipids Mother    • High Cholesterol Brother    • Arthritis Brother    • Other (Hypotension[other]) Father         diet & exercise; no contac systems:     Is on escitalopram 25 mg (takes 1.25 tablets). Felt it was doing better for him. No chest pain, dyspnea on exertion, paresthesias, edema. No pain in varicose veins.  Mother had them, he worse support hose for awhile years ago but is not dany Chol 10/18/2018 102    • HgbA1C 10/18/2018 6.3*   • Estimated Average Glucose 10/18/2018 134*     Flu shot recommended, will defer pneumonia as I expect pt to be considered prediabetic by 2 years after dx. Emphasized repeat lifestyle change.     Refer for c

## 2018-11-26 ENCOUNTER — LAB ENCOUNTER (OUTPATIENT)
Dept: LAB | Age: 48
End: 2018-11-26
Attending: FAMILY MEDICINE
Payer: COMMERCIAL

## 2018-11-26 DIAGNOSIS — Z13.29 SCREENING FOR THYROID DISORDER: ICD-10-CM

## 2018-11-26 DIAGNOSIS — Z13.0 SCREENING FOR DEFICIENCY ANEMIA: ICD-10-CM

## 2018-11-26 DIAGNOSIS — Z12.5 ENCOUNTER FOR PROSTATE CANCER SCREENING: ICD-10-CM

## 2018-11-26 PROCEDURE — 36415 COLL VENOUS BLD VENIPUNCTURE: CPT

## 2018-11-26 PROCEDURE — 85025 COMPLETE CBC W/AUTO DIFF WBC: CPT

## 2018-11-26 PROCEDURE — 84443 ASSAY THYROID STIM HORMONE: CPT

## 2019-03-07 PROBLEM — Z12.11 SPECIAL SCREENING FOR MALIGNANT NEOPLASM OF COLON: Status: ACTIVE | Noted: 2019-03-07

## 2019-04-02 ENCOUNTER — PATIENT OUTREACH (OUTPATIENT)
Dept: FAMILY MEDICINE CLINIC | Facility: CLINIC | Age: 49
End: 2019-04-02

## 2019-04-10 RX ORDER — LOSARTAN POTASSIUM 50 MG/1
TABLET ORAL
Qty: 30 TABLET | Refills: 0 | Status: SHIPPED | OUTPATIENT
Start: 2019-04-10 | End: 2019-05-02

## 2019-04-10 NOTE — TELEPHONE ENCOUNTER
LOV 10/18 Due now. LAST LAB 10/18    LAST RX   Losartan Potassium 50 MG Oral Tab 30 tablet 6 10/23/2018    Sig:   Take 1 tablet (50 mg total) by mouth once daily. Route:   Oral     Note to Pharmacy:   Needs lab for next refill.          Next OV Visi

## 2019-04-22 RX ORDER — ATORVASTATIN CALCIUM 10 MG/1
TABLET, FILM COATED ORAL
Qty: 38 TABLET | Refills: 0 | Status: SHIPPED | OUTPATIENT
Start: 2019-04-22 | End: 2019-05-02

## 2019-04-22 RX ORDER — BLOOD SUGAR DIAGNOSTIC
STRIP MISCELLANEOUS
Qty: 200 STRIP | Refills: 0 | Status: SHIPPED | OUTPATIENT
Start: 2019-04-22 | End: 2019-11-05

## 2019-04-22 NOTE — TELEPHONE ENCOUNTER
LOV 10/23/18    LAST LAB 10/18/18    LAST RX 10/23/18 X 7 MONTHS    Next OV  No future appointments.       PROTOCOL    Cholesterol Medication Protocol Passed4/18 11:54 PM   ALT < 80    ALT resulted within past year    Lipid panel within past 12 months    Appointment within past 12 or next 3 months     REFILLED RX

## 2019-04-23 RX ORDER — BLOOD SUGAR DIAGNOSTIC
STRIP MISCELLANEOUS
Qty: 300 STRIP | Refills: 0 | OUTPATIENT
Start: 2019-04-23

## 2019-05-02 ENCOUNTER — OFFICE VISIT (OUTPATIENT)
Dept: FAMILY MEDICINE CLINIC | Facility: CLINIC | Age: 49
End: 2019-05-02
Payer: COMMERCIAL

## 2019-05-02 VITALS
TEMPERATURE: 99 F | OXYGEN SATURATION: 98 % | HEIGHT: 67.5 IN | RESPIRATION RATE: 16 BRPM | DIASTOLIC BLOOD PRESSURE: 84 MMHG | BODY MASS INDEX: 31.86 KG/M2 | WEIGHT: 205.38 LBS | SYSTOLIC BLOOD PRESSURE: 122 MMHG | HEART RATE: 71 BPM

## 2019-05-02 DIAGNOSIS — E78.2 MIXED HYPERLIPIDEMIA: ICD-10-CM

## 2019-05-02 DIAGNOSIS — Z13.0 SCREENING FOR DEFICIENCY ANEMIA: ICD-10-CM

## 2019-05-02 DIAGNOSIS — E11.65 TYPE 2 DIABETES MELLITUS WITH HYPERGLYCEMIA, WITHOUT LONG-TERM CURRENT USE OF INSULIN (HCC): ICD-10-CM

## 2019-05-02 DIAGNOSIS — F41.9 ANXIETY: ICD-10-CM

## 2019-05-02 DIAGNOSIS — Z13.29 SCREENING FOR THYROID DISORDER: ICD-10-CM

## 2019-05-02 DIAGNOSIS — N48.89 PAIN, PENILE: ICD-10-CM

## 2019-05-02 DIAGNOSIS — I10 ESSENTIAL HYPERTENSION: Primary | ICD-10-CM

## 2019-05-02 PROBLEM — N48.29 INFLAMED PENIS: Status: ACTIVE | Noted: 2019-05-02

## 2019-05-02 PROCEDURE — 99214 OFFICE O/P EST MOD 30 MIN: CPT | Performed by: FAMILY MEDICINE

## 2019-05-02 PROCEDURE — 82043 UR ALBUMIN QUANTITATIVE: CPT | Performed by: FAMILY MEDICINE

## 2019-05-02 PROCEDURE — 82570 ASSAY OF URINE CREATININE: CPT | Performed by: FAMILY MEDICINE

## 2019-05-02 RX ORDER — LOSARTAN POTASSIUM 50 MG/1
50 TABLET ORAL
Qty: 30 TABLET | Refills: 6 | Status: SHIPPED | OUTPATIENT
Start: 2019-05-02 | End: 2019-11-05

## 2019-05-02 RX ORDER — ALPRAZOLAM 0.5 MG/1
TABLET ORAL NIGHTLY PRN
Qty: 20 TABLET | Refills: 0 | Status: SHIPPED | OUTPATIENT
Start: 2019-05-02 | End: 2019-11-05

## 2019-05-02 RX ORDER — ATORVASTATIN CALCIUM 10 MG/1
TABLET, FILM COATED ORAL
Qty: 38 TABLET | Refills: 1 | Status: SHIPPED | OUTPATIENT
Start: 2019-05-02 | End: 2019-11-05

## 2019-05-02 RX ORDER — ESCITALOPRAM OXALATE 20 MG/1
20 TABLET ORAL
Qty: 30 TABLET | Refills: 6 | Status: SHIPPED | OUTPATIENT
Start: 2019-05-02 | End: 2019-11-05

## 2019-05-02 NOTE — PROGRESS NOTES
Mary Mccauley IS A 46year old male HERE FOR Patient presents with:  HTN: Med F/U        History of present illness:     Here for followup. Doesn't check BP on own, bad winter for diet/activity. Anxiety well controlled, no chest pain or SOLANO.        Pas • Other (Hypotension[other]) Father         diet & exercise; no contact since 2000       Social history:       Social History    Socioeconomic History      Marital status: Single      Spouse name: Not on file      Number of children: 0      Years of educ Hobby Hazards: Not Asked        Sleep Concern: No        Stress Concern: Yes          work related        Weight Concern: Not Asked        Special Diet: No          watching carbs/sugars        Back Care: Not Asked        Exercise: Yes          walks with hyperglycemia, without long-term current use of insulin (HCC)  -     MICROALB/CREAT RATIO, RANDOM URINE; Future  -     COMP METABOLIC PANEL (14);  Future  -     Cancel: HEMOGLOBIN A1C; Future  -     HEMOGLOBIN A1C; Future  -     COMP METABOLIC PANEL (1

## 2019-05-15 ENCOUNTER — LAB ENCOUNTER (OUTPATIENT)
Dept: LAB | Age: 49
End: 2019-05-15
Attending: FAMILY MEDICINE
Payer: COMMERCIAL

## 2019-05-15 DIAGNOSIS — E11.65 TYPE 2 DIABETES MELLITUS WITH HYPERGLYCEMIA, WITHOUT LONG-TERM CURRENT USE OF INSULIN (HCC): ICD-10-CM

## 2019-05-15 DIAGNOSIS — I10 ESSENTIAL HYPERTENSION: ICD-10-CM

## 2019-05-15 DIAGNOSIS — R73.03 PREDIABETES: ICD-10-CM

## 2019-05-15 DIAGNOSIS — Z13.0 SCREENING FOR DEFICIENCY ANEMIA: ICD-10-CM

## 2019-05-15 DIAGNOSIS — E78.2 MIXED HYPERLIPIDEMIA: ICD-10-CM

## 2019-05-15 PROCEDURE — 36415 COLL VENOUS BLD VENIPUNCTURE: CPT

## 2019-05-15 PROCEDURE — 82043 UR ALBUMIN QUANTITATIVE: CPT

## 2019-05-15 PROCEDURE — 83036 HEMOGLOBIN GLYCOSYLATED A1C: CPT

## 2019-05-15 PROCEDURE — 80053 COMPREHEN METABOLIC PANEL: CPT

## 2019-05-15 PROCEDURE — 82570 ASSAY OF URINE CREATININE: CPT

## 2019-05-15 PROCEDURE — 80061 LIPID PANEL: CPT

## 2019-05-15 PROCEDURE — 85025 COMPLETE CBC W/AUTO DIFF WBC: CPT

## 2019-05-16 DIAGNOSIS — I10 ESSENTIAL HYPERTENSION: ICD-10-CM

## 2019-05-16 DIAGNOSIS — E78.00 HYPERCHOLESTEROLEMIA: ICD-10-CM

## 2019-05-16 DIAGNOSIS — E11.65 TYPE 2 DIABETES MELLITUS WITH HYPERGLYCEMIA, WITHOUT LONG-TERM CURRENT USE OF INSULIN (HCC): Primary | ICD-10-CM

## 2019-11-05 ENCOUNTER — OFFICE VISIT (OUTPATIENT)
Dept: FAMILY MEDICINE CLINIC | Facility: CLINIC | Age: 49
End: 2019-11-05
Payer: COMMERCIAL

## 2019-11-05 VITALS
TEMPERATURE: 98 F | WEIGHT: 203 LBS | BODY MASS INDEX: 30.07 KG/M2 | DIASTOLIC BLOOD PRESSURE: 78 MMHG | HEIGHT: 69 IN | OXYGEN SATURATION: 99 % | SYSTOLIC BLOOD PRESSURE: 110 MMHG | RESPIRATION RATE: 16 BRPM | HEART RATE: 61 BPM

## 2019-11-05 DIAGNOSIS — E11.9 CONTROLLED TYPE 2 DIABETES MELLITUS WITHOUT COMPLICATION, WITHOUT LONG-TERM CURRENT USE OF INSULIN (HCC): Primary | ICD-10-CM

## 2019-11-05 DIAGNOSIS — Z23 NEED FOR VACCINATION: ICD-10-CM

## 2019-11-05 DIAGNOSIS — E78.2 MIXED HYPERLIPIDEMIA: ICD-10-CM

## 2019-11-05 DIAGNOSIS — F41.9 ANXIETY: ICD-10-CM

## 2019-11-05 DIAGNOSIS — I10 ESSENTIAL HYPERTENSION: ICD-10-CM

## 2019-11-05 PROCEDURE — 90471 IMMUNIZATION ADMIN: CPT | Performed by: FAMILY MEDICINE

## 2019-11-05 PROCEDURE — 90686 IIV4 VACC NO PRSV 0.5 ML IM: CPT | Performed by: FAMILY MEDICINE

## 2019-11-05 PROCEDURE — 90472 IMMUNIZATION ADMIN EACH ADD: CPT | Performed by: FAMILY MEDICINE

## 2019-11-05 PROCEDURE — 90732 PPSV23 VACC 2 YRS+ SUBQ/IM: CPT | Performed by: FAMILY MEDICINE

## 2019-11-05 PROCEDURE — 99214 OFFICE O/P EST MOD 30 MIN: CPT | Performed by: FAMILY MEDICINE

## 2019-11-05 RX ORDER — ALPRAZOLAM 0.5 MG/1
TABLET ORAL NIGHTLY PRN
Qty: 20 TABLET | Refills: 1 | Status: SHIPPED | OUTPATIENT
Start: 2019-11-05 | End: 2020-12-17

## 2019-11-05 RX ORDER — BLOOD SUGAR DIAGNOSTIC
STRIP MISCELLANEOUS
Qty: 200 STRIP | Refills: 0 | Status: SHIPPED | OUTPATIENT
Start: 2019-11-05 | End: 2020-05-08

## 2019-11-05 RX ORDER — ESCITALOPRAM OXALATE 20 MG/1
20 TABLET ORAL
Qty: 30 TABLET | Refills: 6 | Status: SHIPPED | OUTPATIENT
Start: 2019-11-05 | End: 2020-05-08

## 2019-11-05 RX ORDER — ATORVASTATIN CALCIUM 10 MG/1
TABLET, FILM COATED ORAL
Qty: 15 TABLET | Refills: 6 | Status: SHIPPED | OUTPATIENT
Start: 2019-11-05 | End: 2020-05-08

## 2019-11-05 RX ORDER — LOSARTAN POTASSIUM 50 MG/1
50 TABLET ORAL
Qty: 30 TABLET | Refills: 6 | Status: SHIPPED | OUTPATIENT
Start: 2019-11-05 | End: 2020-05-08

## 2019-11-05 NOTE — PROGRESS NOTES
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 There. Paternal Uncle    • Diabetes Paternal Uncle    • Diabetes Paternal Uncle    • Diabetes Maternal Aunt    • Diabetes Maternal Aunt    • Diabetes Other         aunts,uncles   • High Cholesterol Mother    • Lipids Mother    • High Cholesterol Brother    • Arth Physically abused: Not on file        Forced sexual activity: Not on file    Other Topics      Concerns:         Service: Not Asked        Blood Transfusions: Not Asked        Caffeine Concern: No          3/4 of decaf. coffee every other day 05/15/2019 161*   • LDL Cholesterol 05/15/2019 69    • VLDL 05/15/2019 32*   • Non HDL Chol 05/15/2019 101    • HgbA1C 05/15/2019 6.8*   • Estimated Average Glucose 05/15/2019 148*   • Microalbumin, Urine 05/15/2019 <0.50    • Creatinine Ur Random 05/15/20 METABOLIC PANEL (14)  -     LIPID PANEL  -     MICROALB/CREAT RATIO, RANDOM URINE    Essential hypertension  -     losartan Potassium 50 MG Oral Tab;  Take 1 tablet (50 mg total) by mouth once daily.  -     COMP METABOLIC PANEL (14)    Need for vaccination

## 2020-05-08 ENCOUNTER — TELEMEDICINE (OUTPATIENT)
Dept: FAMILY MEDICINE CLINIC | Facility: CLINIC | Age: 50
End: 2020-05-08

## 2020-05-08 DIAGNOSIS — E11.9 CONTROLLED TYPE 2 DIABETES MELLITUS WITHOUT COMPLICATION, WITHOUT LONG-TERM CURRENT USE OF INSULIN (HCC): ICD-10-CM

## 2020-05-08 DIAGNOSIS — F41.9 ANXIETY: ICD-10-CM

## 2020-05-08 DIAGNOSIS — E11.65 TYPE 2 DIABETES MELLITUS WITH HYPERGLYCEMIA, WITHOUT LONG-TERM CURRENT USE OF INSULIN (HCC): ICD-10-CM

## 2020-05-08 DIAGNOSIS — I10 ESSENTIAL HYPERTENSION: Primary | ICD-10-CM

## 2020-05-08 DIAGNOSIS — E78.00 HYPERCHOLESTEROLEMIA: ICD-10-CM

## 2020-05-08 DIAGNOSIS — E78.2 MIXED HYPERLIPIDEMIA: ICD-10-CM

## 2020-05-08 PROCEDURE — 99214 OFFICE O/P EST MOD 30 MIN: CPT | Performed by: FAMILY MEDICINE

## 2020-05-08 RX ORDER — ATORVASTATIN CALCIUM 10 MG/1
TABLET, FILM COATED ORAL
Qty: 15 TABLET | Refills: 6 | Status: SHIPPED | OUTPATIENT
Start: 2020-05-08 | End: 2020-12-17

## 2020-05-08 RX ORDER — ESCITALOPRAM OXALATE 20 MG/1
20 TABLET ORAL
Qty: 30 TABLET | Refills: 6 | Status: SHIPPED | OUTPATIENT
Start: 2020-05-08 | End: 2020-12-17

## 2020-05-08 RX ORDER — BLOOD SUGAR DIAGNOSTIC
STRIP MISCELLANEOUS
Qty: 200 STRIP | Refills: 0 | Status: SHIPPED | OUTPATIENT
Start: 2020-05-08

## 2020-05-08 RX ORDER — LOSARTAN POTASSIUM 50 MG/1
50 TABLET ORAL
Qty: 30 TABLET | Refills: 6 | Status: SHIPPED | OUTPATIENT
Start: 2020-05-08 | End: 2020-12-17

## 2020-05-08 NOTE — PROGRESS NOTES
Joaquín Gonzalez IS A 44year old male evaluated via video visit FOR Patient presents with:  Hypertension  Diabetes: medication f/u . Fasting Glucose  100-118   due to current national emergency with SARS-CoV-2 pandemic.      History of present illness:   2-w Procedure Laterality Date   • APPENDECTOMY      peritonitis   • APPENDECTOMY         Current medications:     Current Outpatient Medications   Medication Sig Dispense Refill   • Glucose Blood (ONETOUCH VERIO) In Vitro Strip Check glucose daily 200 strip since quittin.0      Smokeless tobacco: Never Used      Tobacco comment: had been 1-3 cigarettes per week    Substance and Sexual Activity      Alcohol use:  Yes        Alcohol/week: 3.0 standard drinks        Types: 1 Glasses of wine, 1 Cans of beer, 1 Appropriate speech and mentation. States losing weight. Smaller waistline. There were no vitals taken for this visit. Feels well, appears happy. Test results reviewed:     November.      Assessment & Plan:   Avni Friends was seen today for hypertens

## 2020-05-08 NOTE — PATIENT INSTRUCTIONS
Continue current medications for blood pressure and cholesterol, and diet and exercise to manage diabetes. Please get labs soon in about a month. Call [phone] to schedule      You seem to be doing fairly well.  I do encourage you to take precautions t

## 2020-05-11 ENCOUNTER — TELEPHONE (OUTPATIENT)
Dept: FAMILY MEDICINE CLINIC | Facility: CLINIC | Age: 50
End: 2020-05-11

## 2020-07-17 ENCOUNTER — TELEPHONE (OUTPATIENT)
Dept: FAMILY MEDICINE CLINIC | Facility: CLINIC | Age: 50
End: 2020-07-17

## 2020-07-17 DIAGNOSIS — E11.9 CONTROLLED TYPE 2 DIABETES MELLITUS WITHOUT COMPLICATION, WITHOUT LONG-TERM CURRENT USE OF INSULIN (HCC): Primary | ICD-10-CM

## 2020-07-17 NOTE — TELEPHONE ENCOUNTER
Phone visit 5/8/20  FOV none  Ophtha referral pended for your approval if ok. Please review and advise. Thank you.     From: Youlanda Koyanagi   Sent: 7/17/2020  11:22 AM CDT   To: [facility] Front Office, [facility] Referral Pool   Subject: REFERRAL

## 2020-08-03 ENCOUNTER — APPOINTMENT (OUTPATIENT)
Dept: LAB | Age: 50
End: 2020-08-03
Attending: FAMILY MEDICINE
Payer: COMMERCIAL

## 2020-08-03 LAB
ALBUMIN SERPL-MCNC: 3.8 G/DL (ref 3.4–5)
ALBUMIN/GLOB SERPL: 1 {RATIO} (ref 1–2)
ALP LIVER SERPL-CCNC: 64 U/L (ref 45–117)
ALT SERPL-CCNC: 51 U/L (ref 16–61)
ANION GAP SERPL CALC-SCNC: 5 MMOL/L (ref 0–18)
AST SERPL-CCNC: 24 U/L (ref 15–37)
BILIRUB SERPL-MCNC: 0.9 MG/DL (ref 0.1–2)
BUN BLD-MCNC: 18 MG/DL (ref 7–18)
BUN/CREAT SERPL: 18.2 (ref 10–20)
CALCIUM BLD-MCNC: 8.9 MG/DL (ref 8.5–10.1)
CHLORIDE SERPL-SCNC: 105 MMOL/L (ref 98–112)
CHOLEST SMN-MCNC: 145 MG/DL (ref ?–200)
CO2 SERPL-SCNC: 28 MMOL/L (ref 21–32)
CREAT BLD-MCNC: 0.99 MG/DL (ref 0.7–1.3)
CREAT UR-SCNC: 240 MG/DL
EST. AVERAGE GLUCOSE BLD GHB EST-MCNC: 140 MG/DL (ref 68–126)
GLOBULIN PLAS-MCNC: 3.7 G/DL (ref 2.8–4.4)
GLUCOSE BLD-MCNC: 136 MG/DL (ref 70–99)
HBA1C MFR BLD HPLC: 6.5 % (ref ?–5.7)
HDLC SERPL-MCNC: 39 MG/DL (ref 40–59)
LDLC SERPL CALC-MCNC: 78 MG/DL (ref ?–100)
M PROTEIN MFR SERPL ELPH: 7.5 G/DL (ref 6.4–8.2)
MICROALBUMIN UR-MCNC: 1.2 MG/DL
MICROALBUMIN/CREAT 24H UR-RTO: 5 UG/MG (ref ?–30)
NONHDLC SERPL-MCNC: 106 MG/DL (ref ?–130)
OSMOLALITY SERPL CALC.SUM OF ELEC: 290 MOSM/KG (ref 275–295)
PATIENT FASTING Y/N/NP: YES
PATIENT FASTING Y/N/NP: YES
POTASSIUM SERPL-SCNC: 4.2 MMOL/L (ref 3.5–5.1)
SODIUM SERPL-SCNC: 138 MMOL/L (ref 136–145)
TRIGL SERPL-MCNC: 138 MG/DL (ref 30–149)
VLDLC SERPL CALC-MCNC: 28 MG/DL (ref 0–30)

## 2020-08-03 PROCEDURE — 80053 COMPREHEN METABOLIC PANEL: CPT | Performed by: FAMILY MEDICINE

## 2020-08-03 PROCEDURE — 80061 LIPID PANEL: CPT | Performed by: FAMILY MEDICINE

## 2020-08-03 PROCEDURE — 36415 COLL VENOUS BLD VENIPUNCTURE: CPT | Performed by: FAMILY MEDICINE

## 2020-08-03 PROCEDURE — 82043 UR ALBUMIN QUANTITATIVE: CPT | Performed by: FAMILY MEDICINE

## 2020-08-03 PROCEDURE — 82570 ASSAY OF URINE CREATININE: CPT | Performed by: FAMILY MEDICINE

## 2020-08-03 PROCEDURE — 83036 HEMOGLOBIN GLYCOSYLATED A1C: CPT | Performed by: FAMILY MEDICINE

## 2020-10-01 ENCOUNTER — HOSPITAL ENCOUNTER (OUTPATIENT)
Age: 50
Discharge: HOME | End: 2020-10-01
Attending: EMERGENCY MEDICINE
Payer: COMMERCIAL

## 2020-10-01 VITALS
RESPIRATION RATE: 18 BRPM | HEART RATE: 76 BPM | HEIGHT: 70 IN | SYSTOLIC BLOOD PRESSURE: 143 MMHG | WEIGHT: 190 LBS | TEMPERATURE: 98 F | OXYGEN SATURATION: 99 % | DIASTOLIC BLOOD PRESSURE: 89 MMHG | BODY MASS INDEX: 27.2 KG/M2

## 2020-10-01 DIAGNOSIS — I15.9 SECONDARY HYPERTENSION: ICD-10-CM

## 2020-10-01 DIAGNOSIS — M54.31 SCIATICA OF RIGHT SIDE: Primary | ICD-10-CM

## 2020-10-01 PROCEDURE — 99204 OFFICE O/P NEW MOD 45 MIN: CPT

## 2020-10-01 PROCEDURE — 99213 OFFICE O/P EST LOW 20 MIN: CPT

## 2020-10-01 RX ORDER — MELOXICAM 7.5 MG/1
7.5 TABLET ORAL DAILY
Qty: 30 TABLET | Refills: 0 | Status: SHIPPED | OUTPATIENT
Start: 2020-10-01 | End: 2020-10-31

## 2020-10-01 NOTE — ED PROVIDER NOTES
Patient Seen in: [facility] [place]      History   Patient presents with:  Blood Pressure    Stated Complaint: blood pressure is up    HPI    Pleasant 42-year-old male presents to the urgent care with concern that his blood pressure is blood pressure is up  Other systems are as noted in HPI. Constitutional and vital signs reviewed. All other systems reviewed and negative except as noted above.     Physical Exam     ED Triage Vitals [10/01/20 0824]   /89   Pulse 76   Resp 18 alert and oriented to person, place, and time. Cranial Nerves: No cranial nerve deficit.       Coordination: Coordination normal.   Psychiatric:         Behavior: Behavior normal.         Judgment: Judgment normal.             ED Course   Labs Reviewed

## 2020-10-01 NOTE — ED INITIAL ASSESSMENT (HPI)
Patient states concerned about his blood pressure yesterday after work 160/84 then relaxed and rechecked his pressure then was 150/85.       Patient states 148/82 this morning  Denies any headache this am

## 2020-11-05 ENCOUNTER — TELEPHONE (OUTPATIENT)
Dept: FAMILY MEDICINE CLINIC | Facility: CLINIC | Age: 50
End: 2020-11-05

## 2020-11-05 NOTE — TELEPHONE ENCOUNTER
Patient scheduled in office for an appointment to request a referral.    Patient then stated that some people at his job have tested positive for covid. He said they did not tell him names, so he does not know if he has had contact or not.     He stated he

## 2020-11-05 NOTE — TELEPHONE ENCOUNTER
Called pt stating had scheduled visit to request Ophtha referral for annual DM exam. Pt stating was last at work on Monday (11/2/20) and was told today co-workers in [employer] have tested positive for covid, however, employer would not release names of th

## 2020-11-06 NOTE — TELEPHONE ENCOUNTER
For routine eye referral for DM without other new problems pts don't need separate appointments. We can just refer for the eye exam (we have seen him for DM). We had already put in a referral in July to Dr Kinga Clemons whom he has seen before.

## 2020-11-06 NOTE — TELEPHONE ENCOUNTER
Please double check with pt if he has any covid-like symptoms, if none, he should isolate for 2 weeks if he was advised to, and we can talk with him at any time about any symptoms he develops.

## 2020-11-06 NOTE — TELEPHONE ENCOUNTER
Called pt to inform per PCP may cancel appt as scheduled today (11/6) as not required. Ophtha referral already generated in 7/2020. Per pt stating per Dr. Patricia Monk office no referral on file.  Informed pt will contact office to retrieved fax number and fax

## 2020-12-14 DIAGNOSIS — I10 ESSENTIAL HYPERTENSION: ICD-10-CM

## 2020-12-14 DIAGNOSIS — F41.9 ANXIETY: ICD-10-CM

## 2020-12-15 NOTE — TELEPHONE ENCOUNTER
LOV 5-8-20    LAST LAB 8-3-20     LAST RX 5-3-20 30*6 for both meds    Next OV No future appointments.     PROTOCOL    Name from pharmacy: ESCITALOPRAM 20 MG TABLET          Will file in chart as: ESCITALOPRAM 20 MG Oral Tab    Sig: TAKE ONE TABLET BY MOUTH

## 2020-12-15 NOTE — TELEPHONE ENCOUNTER
Needs OV for refills, can do via video visit if he can't come in soon. Should see in office if possible & willing.

## 2020-12-17 ENCOUNTER — OFFICE VISIT (OUTPATIENT)
Dept: FAMILY MEDICINE CLINIC | Facility: CLINIC | Age: 50
End: 2020-12-17
Payer: COMMERCIAL

## 2020-12-17 VITALS
DIASTOLIC BLOOD PRESSURE: 80 MMHG | BODY MASS INDEX: 31.18 KG/M2 | SYSTOLIC BLOOD PRESSURE: 120 MMHG | HEART RATE: 72 BPM | RESPIRATION RATE: 16 BRPM | WEIGHT: 201 LBS | OXYGEN SATURATION: 98 % | HEIGHT: 67.5 IN | TEMPERATURE: 97 F

## 2020-12-17 DIAGNOSIS — I10 ESSENTIAL HYPERTENSION: ICD-10-CM

## 2020-12-17 DIAGNOSIS — F41.9 ANXIETY: ICD-10-CM

## 2020-12-17 DIAGNOSIS — E78.2 MIXED HYPERLIPIDEMIA: ICD-10-CM

## 2020-12-17 DIAGNOSIS — E11.65 TYPE 2 DIABETES MELLITUS WITH HYPERGLYCEMIA, WITHOUT LONG-TERM CURRENT USE OF INSULIN (HCC): Primary | ICD-10-CM

## 2020-12-17 DIAGNOSIS — Z23 NEED FOR VACCINATION: ICD-10-CM

## 2020-12-17 PROCEDURE — 99214 OFFICE O/P EST MOD 30 MIN: CPT | Performed by: FAMILY MEDICINE

## 2020-12-17 PROCEDURE — 90471 IMMUNIZATION ADMIN: CPT | Performed by: FAMILY MEDICINE

## 2020-12-17 PROCEDURE — 3074F SYST BP LT 130 MM HG: CPT | Performed by: FAMILY MEDICINE

## 2020-12-17 PROCEDURE — 90686 IIV4 VACC NO PRSV 0.5 ML IM: CPT | Performed by: FAMILY MEDICINE

## 2020-12-17 PROCEDURE — 3008F BODY MASS INDEX DOCD: CPT | Performed by: FAMILY MEDICINE

## 2020-12-17 PROCEDURE — 3079F DIAST BP 80-89 MM HG: CPT | Performed by: FAMILY MEDICINE

## 2020-12-17 RX ORDER — ESCITALOPRAM OXALATE 20 MG/1
TABLET ORAL
Qty: 30 TABLET | Refills: 0 | OUTPATIENT
Start: 2020-12-17

## 2020-12-17 RX ORDER — ALPRAZOLAM 0.5 MG/1
TABLET ORAL NIGHTLY PRN
Qty: 20 TABLET | Refills: 1 | Status: SHIPPED | OUTPATIENT
Start: 2020-12-17 | End: 2021-10-27 | Stop reason: ALTCHOICE

## 2020-12-17 RX ORDER — LOSARTAN POTASSIUM 50 MG/1
TABLET ORAL
Qty: 30 TABLET | Refills: 0 | OUTPATIENT
Start: 2020-12-17

## 2020-12-17 RX ORDER — ATORVASTATIN CALCIUM 10 MG/1
TABLET, FILM COATED ORAL
Qty: 15 TABLET | Refills: 6 | Status: SHIPPED | OUTPATIENT
Start: 2020-12-17 | End: 2021-07-20

## 2020-12-17 RX ORDER — ESCITALOPRAM OXALATE 20 MG/1
20 TABLET ORAL
Qty: 30 TABLET | Refills: 6 | Status: SHIPPED | OUTPATIENT
Start: 2020-12-17 | End: 2021-07-20

## 2020-12-17 RX ORDER — LOSARTAN POTASSIUM 50 MG/1
50 TABLET ORAL
Qty: 30 TABLET | Refills: 6 | Status: SHIPPED | OUTPATIENT
Start: 2020-12-17 | End: 2021-07-20

## 2020-12-17 NOTE — PROGRESS NOTES
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      Feeling well Paternal Uncle    • Diabetes Paternal Uncle    • Diabetes Paternal Uncle    • Diabetes Maternal Aunt    • Diabetes Maternal Aunt    • Diabetes Other         aunts,uncles   • High Cholesterol Mother    • Lipids Mother    • High Cholesterol Brother    • Arth Physically abused: Not on file        Forced sexual activity: Not on file    Other Topics      Concerns:         Service: Not Asked        Blood Transfusions: Not Asked        Caffeine Concern: No          3/4 of decaf. coffee every other day tablets (0.25-0.5 mg total) by mouth nightly as needed for Anxiety. Mixed hyperlipidemia  -     atorvastatin 10 MG Oral Tab; TAKE 1 TABLET BY MOUTH ON MONDAY, WEDNESDAY, AND FRIDAY( 3 TIMES PER WEEK)  -     LIPID PANEL;  Future    Need for vaccination  -

## 2020-12-17 NOTE — TELEPHONE ENCOUNTER
Pt is in office visit today.  DENIED AS DUPLICATE, INSTRUCTIONS TO PHARMACY TO CHECK FOR NEW/ REFILLS

## 2020-12-18 ENCOUNTER — MED REC SCAN ONLY (OUTPATIENT)
Dept: FAMILY MEDICINE CLINIC | Facility: CLINIC | Age: 50
End: 2020-12-18

## 2021-03-09 ENCOUNTER — LAB ENCOUNTER (OUTPATIENT)
Dept: LAB | Age: 51
End: 2021-03-09
Attending: FAMILY MEDICINE
Payer: COMMERCIAL

## 2021-03-09 DIAGNOSIS — E11.65 TYPE 2 DIABETES MELLITUS WITH HYPERGLYCEMIA, WITHOUT LONG-TERM CURRENT USE OF INSULIN (HCC): ICD-10-CM

## 2021-03-09 DIAGNOSIS — I10 ESSENTIAL HYPERTENSION: ICD-10-CM

## 2021-03-09 DIAGNOSIS — E78.2 MIXED HYPERLIPIDEMIA: ICD-10-CM

## 2021-03-09 LAB
ALBUMIN SERPL-MCNC: 3.8 G/DL (ref 3.4–5)
ALBUMIN/GLOB SERPL: 1.1 {RATIO} (ref 1–2)
ALP LIVER SERPL-CCNC: 54 U/L
ALT SERPL-CCNC: 40 U/L
ANION GAP SERPL CALC-SCNC: 3 MMOL/L (ref 0–18)
AST SERPL-CCNC: 15 U/L (ref 15–37)
BILIRUB SERPL-MCNC: 0.9 MG/DL (ref 0.1–2)
BUN BLD-MCNC: 16 MG/DL (ref 7–18)
BUN/CREAT SERPL: 16.2 (ref 10–20)
CALCIUM BLD-MCNC: 8.9 MG/DL (ref 8.5–10.1)
CHLORIDE SERPL-SCNC: 106 MMOL/L (ref 98–112)
CHOLEST SMN-MCNC: 138 MG/DL (ref ?–200)
CO2 SERPL-SCNC: 29 MMOL/L (ref 21–32)
CREAT BLD-MCNC: 0.99 MG/DL
CREAT UR-SCNC: 249 MG/DL
EST. AVERAGE GLUCOSE BLD GHB EST-MCNC: 148 MG/DL (ref 68–126)
GLOBULIN PLAS-MCNC: 3.5 G/DL (ref 2.8–4.4)
GLUCOSE BLD-MCNC: 132 MG/DL (ref 70–99)
HBA1C MFR BLD HPLC: 6.8 % (ref ?–5.7)
HDLC SERPL-MCNC: 44 MG/DL (ref 40–59)
LDLC SERPL CALC-MCNC: 72 MG/DL (ref ?–100)
M PROTEIN MFR SERPL ELPH: 7.3 G/DL (ref 6.4–8.2)
MICROALBUMIN UR-MCNC: 0.84 MG/DL
MICROALBUMIN/CREAT 24H UR-RTO: 3.4 UG/MG (ref ?–30)
NONHDLC SERPL-MCNC: 94 MG/DL (ref ?–130)
OSMOLALITY SERPL CALC.SUM OF ELEC: 289 MOSM/KG (ref 275–295)
PATIENT FASTING Y/N/NP: YES
PATIENT FASTING Y/N/NP: YES
POTASSIUM SERPL-SCNC: 4.7 MMOL/L (ref 3.5–5.1)
SODIUM SERPL-SCNC: 138 MMOL/L (ref 136–145)
TRIGL SERPL-MCNC: 111 MG/DL (ref 30–149)
VLDLC SERPL CALC-MCNC: 22 MG/DL (ref 0–30)

## 2021-03-09 PROCEDURE — 82043 UR ALBUMIN QUANTITATIVE: CPT

## 2021-03-09 PROCEDURE — 83036 HEMOGLOBIN GLYCOSYLATED A1C: CPT

## 2021-03-09 PROCEDURE — 80061 LIPID PANEL: CPT

## 2021-03-09 PROCEDURE — 80053 COMPREHEN METABOLIC PANEL: CPT

## 2021-03-09 PROCEDURE — 82570 ASSAY OF URINE CREATININE: CPT

## 2021-03-09 PROCEDURE — 36415 COLL VENOUS BLD VENIPUNCTURE: CPT

## 2021-03-09 PROCEDURE — 3044F HG A1C LEVEL LT 7.0%: CPT | Performed by: FAMILY MEDICINE

## 2021-03-09 PROCEDURE — 3061F NEG MICROALBUMINURIA REV: CPT | Performed by: FAMILY MEDICINE

## 2021-03-22 NOTE — TELEPHONE ENCOUNTER
It appears that patient increased the dose on his own. Please verify if this is the case. I'll authorize a 90 day refill but he will be due in October for a recheck on sugar and I'd like to see him then to review anxiety also.
Patient called requesting refill. He says he is running out because of dosing. Transferred to triage line.
Pt's VM indicated he has increased his medication to 1.5 tablets a day and is running out    Medication Quantity Refills Start End   escitalopram 10 MG Oral Tab 90 tablet 0 6/20/2017      Pt states his anxiety is much better and would like a refill at the
YES, PT DID INCREASE ON HIS OWN BECAUSE HE FELT THE 10 MG DID NOT CONTROL HIS SYMPTOMS    PT UNDERSTANDS TO MAKE F/U APPT FOR OCTOBER  No future appointments.
Airway patent, Nasal mucosa clear. Mouth with normal mucosa. Throat has no vesicles, no oropharyngeal exudates and uvula is midline.

## 2021-03-25 ENCOUNTER — IMMUNIZATION (OUTPATIENT)
Dept: LAB | Facility: HOSPITAL | Age: 51
End: 2021-03-25
Attending: HOSPITALIST
Payer: COMMERCIAL

## 2021-03-25 DIAGNOSIS — Z23 NEED FOR VACCINATION: Primary | ICD-10-CM

## 2021-03-25 PROCEDURE — 0011A SARSCOV2 VAC 100MCG/0.5ML IM: CPT

## 2021-04-23 ENCOUNTER — IMMUNIZATION (OUTPATIENT)
Dept: LAB | Facility: HOSPITAL | Age: 51
End: 2021-04-23
Attending: EMERGENCY MEDICINE
Payer: COMMERCIAL

## 2021-04-23 DIAGNOSIS — Z23 NEED FOR VACCINATION: Primary | ICD-10-CM

## 2021-04-23 PROCEDURE — 0012A SARSCOV2 VAC 100MCG/0.5ML IM: CPT | Performed by: PHYSICIAN ASSISTANT

## 2021-06-22 ENCOUNTER — TELEPHONE (OUTPATIENT)
Dept: FAMILY MEDICINE CLINIC | Facility: CLINIC | Age: 51
End: 2021-06-22

## 2021-06-22 DIAGNOSIS — E11.65 TYPE 2 DIABETES MELLITUS WITH HYPERGLYCEMIA, WITHOUT LONG-TERM CURRENT USE OF INSULIN (HCC): Primary | ICD-10-CM

## 2021-06-22 RX ORDER — BLOOD-GLUCOSE METER
1 EACH MISCELLANEOUS 2 TIMES DAILY
Qty: 1 KIT | Refills: 0 | Status: SHIPPED | OUTPATIENT
Start: 2021-06-22 | End: 2021-10-27

## 2021-06-22 NOTE — TELEPHONE ENCOUNTER
Pharmacy calling stating that the pt needs a new prescription for the One Touch Verio Reflect Meter. Has the test strips for it but the pt needs new device.  Please advise

## 2021-06-22 NOTE — TELEPHONE ENCOUNTER
Dr. Kimberly Darby patient. LOV 12-17-20. Does not follow with diabetes clinic. Pended order for new glucometer.

## 2021-06-23 RX ORDER — BLOOD-GLUCOSE METER
1 KIT MISCELLANEOUS 2 TIMES DAILY
Qty: 1 KIT | Refills: 0 | Status: SHIPPED | OUTPATIENT
Start: 2021-06-23

## 2021-06-23 NOTE — TELEPHONE ENCOUNTER
Pharmacy is asking for One Touch Verio Reflect Meter. Pt has test strips for Reflect System,   Not Flex system.     [id number] - [address] [facility] [address], [phone]

## 2021-07-18 DIAGNOSIS — E78.2 MIXED HYPERLIPIDEMIA: ICD-10-CM

## 2021-07-18 DIAGNOSIS — I10 ESSENTIAL HYPERTENSION: ICD-10-CM

## 2021-07-18 DIAGNOSIS — F41.9 ANXIETY: ICD-10-CM

## 2021-07-20 RX ORDER — LOSARTAN POTASSIUM 50 MG/1
TABLET ORAL
Qty: 30 TABLET | Refills: 0 | Status: SHIPPED | OUTPATIENT
Start: 2021-07-20 | End: 2021-07-26

## 2021-07-20 RX ORDER — ESCITALOPRAM OXALATE 20 MG/1
TABLET ORAL
Qty: 30 TABLET | Refills: 0 | Status: SHIPPED | OUTPATIENT
Start: 2021-07-20 | End: 2021-07-26

## 2021-07-20 RX ORDER — ATORVASTATIN CALCIUM 10 MG/1
TABLET, FILM COATED ORAL
Qty: 15 TABLET | Refills: 5 | Status: SHIPPED | OUTPATIENT
Start: 2021-07-20 | End: 2021-07-26

## 2021-07-20 NOTE — TELEPHONE ENCOUNTER
LOV 12/17/2020      LAST LAB 3/9/2021    LAST RX   atorvastatin 10 MG Oral Tab 15 tablet 6 12/17/2020     escitalopram 20 MG Oral Tab 30 tablet 6 12/17/2020     losartan Potassium 50 MG Oral Tab 30 tablet 6 12/17/2020           Next OV No future appointments.

## 2021-07-26 ENCOUNTER — OFFICE VISIT (OUTPATIENT)
Dept: FAMILY MEDICINE CLINIC | Facility: CLINIC | Age: 51
End: 2021-07-26
Payer: COMMERCIAL

## 2021-07-26 VITALS
TEMPERATURE: 97 F | WEIGHT: 203 LBS | RESPIRATION RATE: 18 BRPM | SYSTOLIC BLOOD PRESSURE: 126 MMHG | HEIGHT: 67.5 IN | BODY MASS INDEX: 31.49 KG/M2 | DIASTOLIC BLOOD PRESSURE: 60 MMHG | OXYGEN SATURATION: 94 % | HEART RATE: 83 BPM

## 2021-07-26 DIAGNOSIS — E78.2 MIXED HYPERLIPIDEMIA: ICD-10-CM

## 2021-07-26 DIAGNOSIS — E11.65 TYPE 2 DIABETES MELLITUS WITH HYPERGLYCEMIA, WITHOUT LONG-TERM CURRENT USE OF INSULIN (HCC): ICD-10-CM

## 2021-07-26 DIAGNOSIS — Z00.00 ENCOUNTER FOR ANNUAL PHYSICAL EXAM: Primary | ICD-10-CM

## 2021-07-26 DIAGNOSIS — I10 ESSENTIAL HYPERTENSION: ICD-10-CM

## 2021-07-26 DIAGNOSIS — F41.9 ANXIETY: ICD-10-CM

## 2021-07-26 PROCEDURE — 99396 PREV VISIT EST AGE 40-64: CPT | Performed by: FAMILY MEDICINE

## 2021-07-26 PROCEDURE — 99213 OFFICE O/P EST LOW 20 MIN: CPT | Performed by: FAMILY MEDICINE

## 2021-07-26 PROCEDURE — 3008F BODY MASS INDEX DOCD: CPT | Performed by: FAMILY MEDICINE

## 2021-07-26 PROCEDURE — 3074F SYST BP LT 130 MM HG: CPT | Performed by: FAMILY MEDICINE

## 2021-07-26 PROCEDURE — 3078F DIAST BP <80 MM HG: CPT | Performed by: FAMILY MEDICINE

## 2021-07-26 RX ORDER — ATORVASTATIN CALCIUM 10 MG/1
TABLET, FILM COATED ORAL
Qty: 45 TABLET | Refills: 1 | Status: SHIPPED | OUTPATIENT
Start: 2021-07-26 | End: 2022-01-03

## 2021-07-26 RX ORDER — ESCITALOPRAM OXALATE 20 MG/1
20 TABLET ORAL DAILY
Qty: 90 TABLET | Refills: 1 | Status: SHIPPED | OUTPATIENT
Start: 2021-07-26 | End: 2021-10-27

## 2021-07-26 RX ORDER — LOSARTAN POTASSIUM 50 MG/1
50 TABLET ORAL DAILY
Qty: 90 TABLET | Refills: 2 | Status: SHIPPED | OUTPATIENT
Start: 2021-07-26 | End: 2022-01-03

## 2021-07-26 NOTE — PROGRESS NOTES
/60   Pulse 83   Temp 96.5 °F (35.8 °C) (Temporal)   Resp 18   Ht 5' 7.5\" (1.715 m)   Wt 203 lb (92.1 kg)   SpO2 94%   BMI 31.33 kg/m²  Body mass index is 31.33 kg/m².      Patient presents with:  Diabetes: f/u      Ana Collins is a 46year old Suppl ([address]) w/Device Does not apply Kit 1 each by Does not apply route 2 (two) times a day. Test blood glucose level two times daily.  1 kit 0   • ALPRAZolam (XANAX) 0.5 MG Oral Tab Take 0.5-1 tablets (0.25-0.5 mg total) by mouth nigh Vaping Use: Never used    Substance and Sexual Activity      Alcohol use:  Yes        Alcohol/week: 3.0 standard drinks        Types: 1 Glasses of wine, 1 Cans of beer, 1 Standard drinks or equivalent per week        Comment: socially ; any type      Drug adenopathy,no bruits  CHEST: no chest tenderness  LUNGS: clear to auscultation  CARDIO: RRR without murmur  GI: good BS's,no masses, HSM or tenderness  : Deferred  RECTAL:Deferred  MUSCULOSKELETAL: back is not tender,FROM of the back  EXTREMITIES: no cya Dietary measures discussed include diet about 1800 calories - Excercise regimen also reviewed as well as long term benefits on overall health.    Recommend at least 30 minutes a day 3-4 times a week of aerobic activity such as brisk walking, cycling, aerobi

## 2021-07-27 ENCOUNTER — TELEPHONE (OUTPATIENT)
Dept: FAMILY MEDICINE CLINIC | Facility: CLINIC | Age: 51
End: 2021-07-27

## 2021-07-28 ENCOUNTER — MED REC SCAN ONLY (OUTPATIENT)
Dept: FAMILY MEDICINE CLINIC | Facility: CLINIC | Age: 51
End: 2021-07-28

## 2021-08-10 ENCOUNTER — LAB ENCOUNTER (OUTPATIENT)
Dept: LAB | Age: 51
End: 2021-08-10
Attending: FAMILY MEDICINE
Payer: COMMERCIAL

## 2021-08-10 DIAGNOSIS — Z00.00 ENCOUNTER FOR ANNUAL PHYSICAL EXAM: ICD-10-CM

## 2021-08-10 DIAGNOSIS — E11.65 TYPE 2 DIABETES MELLITUS WITH HYPERGLYCEMIA, WITHOUT LONG-TERM CURRENT USE OF INSULIN (HCC): ICD-10-CM

## 2021-08-10 LAB
ALBUMIN SERPL-MCNC: 3.6 G/DL (ref 3.4–5)
ALBUMIN/GLOB SERPL: 1 {RATIO} (ref 1–2)
ALP LIVER SERPL-CCNC: 53 U/L
ALT SERPL-CCNC: 41 U/L
ANION GAP SERPL CALC-SCNC: 3 MMOL/L (ref 0–18)
AST SERPL-CCNC: 20 U/L (ref 15–37)
BASOPHILS # BLD AUTO: 0.02 X10(3) UL (ref 0–0.2)
BASOPHILS NFR BLD AUTO: 0.4 %
BILIRUB SERPL-MCNC: 1.3 MG/DL (ref 0.1–2)
BUN BLD-MCNC: 16 MG/DL (ref 7–18)
CALCIUM BLD-MCNC: 8.4 MG/DL (ref 8.5–10.1)
CHLORIDE SERPL-SCNC: 108 MMOL/L (ref 98–112)
CO2 SERPL-SCNC: 28 MMOL/L (ref 21–32)
COMPLEXED PSA SERPL-MCNC: 0.78 NG/ML (ref ?–4)
CREAT BLD-MCNC: 1.05 MG/DL
EOSINOPHIL # BLD AUTO: 0.12 X10(3) UL (ref 0–0.7)
EOSINOPHIL NFR BLD AUTO: 2.3 %
ERYTHROCYTE [DISTWIDTH] IN BLOOD BY AUTOMATED COUNT: 12.4 %
EST. AVERAGE GLUCOSE BLD GHB EST-MCNC: 137 MG/DL (ref 68–126)
GLOBULIN PLAS-MCNC: 3.6 G/DL (ref 2.8–4.4)
GLUCOSE BLD-MCNC: 115 MG/DL (ref 70–99)
HBA1C MFR BLD HPLC: 6.4 % (ref ?–5.7)
HCT VFR BLD AUTO: 46.6 %
HGB BLD-MCNC: 15.2 G/DL
IMM GRANULOCYTES # BLD AUTO: 0.02 X10(3) UL (ref 0–1)
IMM GRANULOCYTES NFR BLD: 0.4 %
LYMPHOCYTES # BLD AUTO: 1.57 X10(3) UL (ref 1–4)
LYMPHOCYTES NFR BLD AUTO: 30.5 %
M PROTEIN MFR SERPL ELPH: 7.2 G/DL (ref 6.4–8.2)
MCH RBC QN AUTO: 30 PG (ref 26–34)
MCHC RBC AUTO-ENTMCNC: 32.6 G/DL (ref 31–37)
MCV RBC AUTO: 91.9 FL
MONOCYTES # BLD AUTO: 0.57 X10(3) UL (ref 0.1–1)
MONOCYTES NFR BLD AUTO: 11.1 %
NEUTROPHILS # BLD AUTO: 2.84 X10 (3) UL (ref 1.5–7.7)
NEUTROPHILS # BLD AUTO: 2.84 X10(3) UL (ref 1.5–7.7)
NEUTROPHILS NFR BLD AUTO: 55.3 %
OSMOLALITY SERPL CALC.SUM OF ELEC: 290 MOSM/KG (ref 275–295)
PLATELET # BLD AUTO: 192 10(3)UL (ref 150–450)
POTASSIUM SERPL-SCNC: 4.5 MMOL/L (ref 3.5–5.1)
RBC # BLD AUTO: 5.07 X10(6)UL
SODIUM SERPL-SCNC: 139 MMOL/L (ref 136–145)
T4 FREE SERPL-MCNC: 0.9 NG/DL (ref 0.8–1.7)
TSI SER-ACNC: 2.14 MIU/ML (ref 0.36–3.74)
WBC # BLD AUTO: 5.1 X10(3) UL (ref 4–11)

## 2021-08-10 PROCEDURE — 3044F HG A1C LEVEL LT 7.0%: CPT | Performed by: PHYSICIAN ASSISTANT

## 2021-08-10 PROCEDURE — 84439 ASSAY OF FREE THYROXINE: CPT

## 2021-08-10 PROCEDURE — 80053 COMPREHEN METABOLIC PANEL: CPT

## 2021-08-10 PROCEDURE — 83036 HEMOGLOBIN GLYCOSYLATED A1C: CPT

## 2021-08-10 PROCEDURE — 36415 COLL VENOUS BLD VENIPUNCTURE: CPT

## 2021-08-10 PROCEDURE — 84443 ASSAY THYROID STIM HORMONE: CPT

## 2021-08-10 PROCEDURE — 85025 COMPLETE CBC W/AUTO DIFF WBC: CPT

## 2021-08-11 NOTE — PROGRESS NOTES
Hemoglobin A1c of 6.4, improved from 5 months agoContinue the diet and exerciseRest of the labs are in acceptable limits

## 2021-08-16 ENCOUNTER — TELEPHONE (OUTPATIENT)
Dept: FAMILY MEDICINE CLINIC | Facility: CLINIC | Age: 51
End: 2021-08-16

## 2021-08-16 NOTE — TELEPHONE ENCOUNTER
Last office visit: (if over 1 year, schedule appointment)  7/26/21  Appointment scheduled with:    Dr. Ashlie Epsinal    Requested medication:   Losartan and escitilapram     Pharmacy:  [facility] [id number] - [address] [phone]

## 2021-10-18 ENCOUNTER — TELEMEDICINE (OUTPATIENT)
Dept: FAMILY MEDICINE CLINIC | Facility: CLINIC | Age: 51
End: 2021-10-18

## 2021-10-18 ENCOUNTER — TELEPHONE (OUTPATIENT)
Dept: FAMILY MEDICINE CLINIC | Facility: CLINIC | Age: 51
End: 2021-10-18

## 2021-10-18 DIAGNOSIS — J30.9 ALLERGIC RHINITIS, UNSPECIFIED SEASONALITY, UNSPECIFIED TRIGGER: Primary | ICD-10-CM

## 2021-10-18 PROCEDURE — 99213 OFFICE O/P EST LOW 20 MIN: CPT | Performed by: FAMILY MEDICINE

## 2021-10-18 NOTE — TELEPHONE ENCOUNTER
Pt called asking for an appointment for flu like symptoms.       Hard to hear & understand - tired, sinus, throat pain

## 2021-10-18 NOTE — TELEPHONE ENCOUNTER
Called patient who states he developed runny nose,nasal congestion with yellow mucus last week. He did a covid test which was negative. Took Cold & Flu medicine formula for HTN.  Took B/P at home and it was 137/77 and had a panic attack and called the Para

## 2021-10-20 ENCOUNTER — APPOINTMENT (OUTPATIENT)
Dept: GENERAL RADIOLOGY | Age: 51
End: 2021-10-20
Attending: EMERGENCY MEDICINE
Payer: COMMERCIAL

## 2021-10-20 ENCOUNTER — HOSPITAL ENCOUNTER (OUTPATIENT)
Age: 51
Discharge: HOME | End: 2021-10-20
Attending: EMERGENCY MEDICINE
Payer: COMMERCIAL

## 2021-10-20 VITALS
BODY MASS INDEX: 28.14 KG/M2 | WEIGHT: 190 LBS | DIASTOLIC BLOOD PRESSURE: 82 MMHG | HEART RATE: 60 BPM | SYSTOLIC BLOOD PRESSURE: 142 MMHG | OXYGEN SATURATION: 98 % | TEMPERATURE: 99 F | RESPIRATION RATE: 18 BRPM | HEIGHT: 69 IN

## 2021-10-20 DIAGNOSIS — R00.2 PALPITATIONS: Primary | ICD-10-CM

## 2021-10-20 PROCEDURE — 71046 X-RAY EXAM CHEST 2 VIEWS: CPT | Performed by: EMERGENCY MEDICINE

## 2021-10-20 PROCEDURE — 99215 OFFICE O/P EST HI 40 MIN: CPT

## 2021-10-20 PROCEDURE — 84484 ASSAY OF TROPONIN QUANT: CPT

## 2021-10-20 PROCEDURE — 85025 COMPLETE CBC W/AUTO DIFF WBC: CPT | Performed by: EMERGENCY MEDICINE

## 2021-10-20 PROCEDURE — 85378 FIBRIN DEGRADE SEMIQUANT: CPT | Performed by: EMERGENCY MEDICINE

## 2021-10-20 PROCEDURE — 36415 COLL VENOUS BLD VENIPUNCTURE: CPT

## 2021-10-20 PROCEDURE — 93005 ELECTROCARDIOGRAM TRACING: CPT

## 2021-10-20 PROCEDURE — 99214 OFFICE O/P EST MOD 30 MIN: CPT

## 2021-10-20 PROCEDURE — 93010 ELECTROCARDIOGRAM REPORT: CPT

## 2021-10-20 PROCEDURE — 80047 BASIC METABLC PNL IONIZED CA: CPT

## 2021-10-20 RX ORDER — MAGNESIUM HYDROXIDE/ALUMINUM HYDROXICE/SIMETHICONE 120; 1200; 1200 MG/30ML; MG/30ML; MG/30ML
30 SUSPENSION ORAL ONCE
Status: COMPLETED | OUTPATIENT
Start: 2021-10-20 | End: 2021-10-20

## 2021-10-20 NOTE — ED PROVIDER NOTES
Patient Seen in: [facility] [place]      History   Patient presents with:  Abdomen/Flank Pain    Stated Complaint: heartbeat elevated / abdominal discomfort    Subjective:   HPI    Patient has history of diet-controlled diabetes, high cholesterol blood mixed with the stool. Patient notes that he does have a history of hemorrhoids. Patient denies any recent vomiting or diarrhea. No fever. No extraordinary cough. No calf pain or swelling. No rashes.     Objective:   Past Medical History:   Diagnosis D anxious and is taking deep sighing breaths  Eyes: sclera white, conjunctiva pink and moist.  Lids and lashes are normal.  Neck: With no JVD  Lungs: Clear to auscultation bilaterally. No rhonchi or rales. Heart: Normal S1 and S2, without murmur or rub.   D it is beyond the capabilities of this urgent care to exclude the possibility of heart disease. Also, I cannot thoroughly investigate his abdominal complaint. Patient tells me that he feels that his symptoms are more related to anxiety than anything else.  H

## 2021-10-20 NOTE — ED INITIAL ASSESSMENT (HPI)
Pt here c/o abd burning and heart racing for last 2 hrs. States ambulance came to his work to assess him. States still not feeling well and came to IC. States he does have anxiety. Denies chest pain. Denies sob.    Has had runny nose on Thurs and t

## 2021-10-22 ENCOUNTER — TELEPHONE (OUTPATIENT)
Dept: FAMILY MEDICINE CLINIC | Facility: CLINIC | Age: 51
End: 2021-10-22

## 2021-10-22 RX ORDER — BLOOD SUGAR DIAGNOSTIC
STRIP MISCELLANEOUS
Qty: 100 EACH | Refills: 2 | Status: SHIPPED | OUTPATIENT
Start: 2021-10-22 | End: 2021-10-27

## 2021-10-22 NOTE — TELEPHONE ENCOUNTER
Pt calling stating that the pharmacy sent over a request a few days ago for test strips. Did we receive it via fax? Please advise.       Pt has appt for next week   Future Appointments   Date Time Provider Amna Hill   10/27/2021 10:15 AM Kev Sharma

## 2021-10-27 NOTE — PATIENT INSTRUCTIONS
Treating Anxiety Disorders with Medicine  An anxiety disorder can make you feel nervous or apprehensive, even without a clear reason.  In people age 72 and older, generalized anxiety disorder is one of the most commonly diagnosed anxiety disorders. Many t only the amount of medicine prescribed for you. If you think you may have taken too much, get emergency care right away. Never share your medicines with others. Store these medicines in a safe place that can't be reached by children or visitors.    Keep luigi of addiction, you may not be able to use certain medicines used to treat anxiety disorders. · Medicine interactions. Always check with your pharmacist before using any over-the-counter medicines (OTCs), including herbal supplements.  Some OTCs may interact

## 2021-10-27 NOTE — PROGRESS NOTES
[facility] Note    Chief complaint: Patient presents with:  Medication Follow-Up    HPI:   Patient is a 46year old male with a PMH of  has a past medical history of Cryptic tonsil, Diabetes mellitus ([address] Gilbert's disease, Hemo used    Alcohol use:  Yes      Alcohol/week: 3.0 standard drinks      Types: 1 Glasses of wine, 1 Cans of beer, 1 Standard drinks or equivalent per week      Comment: socially ; any type    Drug use: No    Counseling given: Not Answered  Comment: had been 1 Eyes: EOMI, PERRLA, no scleral icterus, conjunctivae clear bilaterally. Throat:  No tonsillar erythema or exudate. Mouth:  No oral lesions or ulcerations, no dental abnormalities noted.   LUNGS: clear to auscultation bilaterally, no rales/rhonchi/wheezing tablets three times daily as needed for anxiety   • escitalopram 20 MG Oral Tab 90 tablet 0     Sig: Take 1.5 tablets (30 mg total) by mouth daily.        Health Maintenance:  Zoster Vaccines(1 of 2) Never done  Influenza Vaccine(1) due on 10/01/2021  Diabe

## 2021-11-02 ENCOUNTER — OFFICE VISIT (OUTPATIENT)
Dept: FAMILY MEDICINE CLINIC | Facility: CLINIC | Age: 51
End: 2021-11-02
Payer: COMMERCIAL

## 2021-11-02 VITALS
DIASTOLIC BLOOD PRESSURE: 82 MMHG | BODY MASS INDEX: 30.92 KG/M2 | HEIGHT: 67 IN | WEIGHT: 197 LBS | OXYGEN SATURATION: 99 % | RESPIRATION RATE: 20 BRPM | SYSTOLIC BLOOD PRESSURE: 140 MMHG | HEART RATE: 84 BPM | TEMPERATURE: 99 F

## 2021-11-02 DIAGNOSIS — R35.0 URINARY FREQUENCY: Primary | ICD-10-CM

## 2021-11-02 PROCEDURE — 81003 URINALYSIS AUTO W/O SCOPE: CPT | Performed by: PHYSICIAN ASSISTANT

## 2021-11-02 PROCEDURE — 87086 URINE CULTURE/COLONY COUNT: CPT | Performed by: PHYSICIAN ASSISTANT

## 2021-11-02 PROCEDURE — 3008F BODY MASS INDEX DOCD: CPT | Performed by: PHYSICIAN ASSISTANT

## 2021-11-02 PROCEDURE — 3079F DIAST BP 80-89 MM HG: CPT | Performed by: PHYSICIAN ASSISTANT

## 2021-11-02 PROCEDURE — 3077F SYST BP >= 140 MM HG: CPT | Performed by: PHYSICIAN ASSISTANT

## 2021-11-02 PROCEDURE — 99213 OFFICE O/P EST LOW 20 MIN: CPT | Performed by: PHYSICIAN ASSISTANT

## 2021-11-02 NOTE — PROGRESS NOTES
CHIEF COMPLAINT:   Patient presents with:  UTI: 4 Days feels like not urinating completely, back pain at times OTC none       HPI:   Abbie Tarango is a 46year old male who presents with symptoms of UTI.  Complaining of urinary frequency and urgency for 8.00        Quit date: 5/3/2014        Years since quittin.5      Smokeless tobacco: Never Used      Tobacco comment: had been 1-3 cigarettes per week    Vaping Use      Vaping Use: Never used    Alcohol use:  Yes      Alcohol/week: 3.0 standard drinks (primary encounter diagnosis)   UA normal in office   Will send urine culture   Discussed WIC limitations.  Discussed possible prostate issues and recommend close PCP follow up             The patient indicates understanding of these issues and agrees to t

## 2021-11-03 NOTE — PATIENT INSTRUCTIONS
Push fluids   Will call with culture results   Close PCP follow up if symptoms persist   ED for any worsening symptoms- stomach pain, fever, vomiting

## 2021-12-03 ENCOUNTER — LAB ENCOUNTER (OUTPATIENT)
Dept: LAB | Age: 51
End: 2021-12-03
Attending: STUDENT IN AN ORGANIZED HEALTH CARE EDUCATION/TRAINING PROGRAM
Payer: COMMERCIAL

## 2021-12-03 DIAGNOSIS — E11.9 DIET-CONTROLLED TYPE 2 DIABETES MELLITUS (HCC): ICD-10-CM

## 2021-12-03 PROCEDURE — 36415 COLL VENOUS BLD VENIPUNCTURE: CPT

## 2021-12-03 PROCEDURE — 83036 HEMOGLOBIN GLYCOSYLATED A1C: CPT

## 2021-12-22 DIAGNOSIS — F41.1 GENERALIZED ANXIETY DISORDER: ICD-10-CM

## 2021-12-22 RX ORDER — HYDROXYZINE HYDROCHLORIDE 10 MG/1
TABLET, FILM COATED ORAL
Qty: 60 TABLET | Refills: 0 | Status: SHIPPED | OUTPATIENT
Start: 2021-12-22

## 2021-12-22 NOTE — TELEPHONE ENCOUNTER
LOV: 10/27/21  for: MONA  Patient advised to RTC on:  12/2021  Previous Rx:  Hydroxyzine 10mgà Sig: Take 1-2 tablets three times daily as needed for anxiety. Disp #40/0 refills. LF: 10/27/21    My chart message sent to make a follow up appt.

## 2021-12-30 ENCOUNTER — HOSPITAL ENCOUNTER (OUTPATIENT)
Age: 51
Discharge: HOME | End: 2021-12-30
Payer: COMMERCIAL

## 2021-12-30 ENCOUNTER — APPOINTMENT (OUTPATIENT)
Dept: CT IMAGING | Age: 51
End: 2021-12-30
Attending: NURSE PRACTITIONER
Payer: COMMERCIAL

## 2021-12-30 VITALS
HEIGHT: 70 IN | RESPIRATION RATE: 12 BRPM | SYSTOLIC BLOOD PRESSURE: 126 MMHG | TEMPERATURE: 98 F | BODY MASS INDEX: 27.2 KG/M2 | DIASTOLIC BLOOD PRESSURE: 75 MMHG | OXYGEN SATURATION: 96 % | HEART RATE: 92 BPM | WEIGHT: 190 LBS

## 2021-12-30 DIAGNOSIS — K52.9 ENTEROCOLITIS: Primary | ICD-10-CM

## 2021-12-30 LAB
#MXD IC: 0.7 X10ˆ3/UL (ref 0.1–1)
BUN BLD-MCNC: 16 MG/DL (ref 7–18)
CHLORIDE BLD-SCNC: 100 MMOL/L (ref 98–112)
CO2 BLD-SCNC: 26 MMOL/L (ref 21–32)
CREAT BLD-MCNC: 0.9 MG/DL
GLUCOSE BLD-MCNC: 155 MG/DL (ref 70–99)
HCT VFR BLD AUTO: 51.4 %
HCT VFR BLD CALC: 53 %
HGB BLD-MCNC: 16.9 G/DL
ISTAT IONIZED CALCIUM FOR CHEM 8: 1.26 MMOL/L (ref 1.12–1.32)
LYMPHOCYTES # BLD AUTO: 0.9 X10ˆ3/UL (ref 1–4)
LYMPHOCYTES NFR BLD AUTO: 6.5 %
MCH RBC QN AUTO: 30.6 PG (ref 26–34)
MCHC RBC AUTO-ENTMCNC: 32.9 G/DL (ref 31–37)
MCV RBC AUTO: 93.1 FL (ref 80–100)
MIXED CELL %: 5 %
NEUTROPHILS # BLD AUTO: 12.4 X10ˆ3/UL (ref 1.5–7.7)
NEUTROPHILS NFR BLD AUTO: 88.5 %
PLATELET # BLD AUTO: 221 X10ˆ3/UL (ref 150–450)
POCT BLOOD URINE: NEGATIVE
POCT GLUCOSE URINE: NEGATIVE MG/DL
POCT KETONE URINE: 80 MG/DL
POCT LEUKOCYTE ESTERASE URINE: NEGATIVE
POCT NITRITE URINE: NEGATIVE
POCT PH URINE: 6.5 (ref 5–8)
POCT SPECIFIC GRAVITY URINE: 1.02
POCT URINE CLARITY: CLEAR
POCT UROBILINOGEN URINE: 1 MG/DL
POTASSIUM BLD-SCNC: 4 MMOL/L (ref 3.6–5.1)
RBC # BLD AUTO: 5.52 X10ˆ6/UL
SODIUM BLD-SCNC: 139 MMOL/L (ref 136–145)
WBC # BLD AUTO: 14 X10ˆ3/UL (ref 4–11)

## 2021-12-30 PROCEDURE — 80047 BASIC METABLC PNL IONIZED CA: CPT

## 2021-12-30 PROCEDURE — 99214 OFFICE O/P EST MOD 30 MIN: CPT

## 2021-12-30 PROCEDURE — 81002 URINALYSIS NONAUTO W/O SCOPE: CPT | Performed by: NURSE PRACTITIONER

## 2021-12-30 PROCEDURE — 74177 CT ABD & PELVIS W/CONTRAST: CPT | Performed by: NURSE PRACTITIONER

## 2021-12-30 PROCEDURE — 85025 COMPLETE CBC W/AUTO DIFF WBC: CPT | Performed by: NURSE PRACTITIONER

## 2021-12-30 PROCEDURE — 99213 OFFICE O/P EST LOW 20 MIN: CPT

## 2021-12-30 PROCEDURE — 96360 HYDRATION IV INFUSION INIT: CPT

## 2021-12-30 RX ORDER — SODIUM CHLORIDE 9 MG/ML
1000 INJECTION, SOLUTION INTRAVENOUS ONCE
Status: COMPLETED | OUTPATIENT
Start: 2021-12-30 | End: 2021-12-30

## 2021-12-30 NOTE — ED INITIAL ASSESSMENT (HPI)
Patient presents to IC with c/o midabdominal pain x 2 days. Yesterday had n/v.No diarrhea. No fever.h/o peritonitis s/p ruptured appe '91

## 2021-12-30 NOTE — ED PROVIDER NOTES
Patient Seen in: [facility] [place]      History   Patient presents with:  Abdominal Pain    Stated Complaint: abdominal pain    Subjective:   HPI  64-year-old male with abdominal surgical history of appendectomy and peritonitis presents to the i Current:/75   Pulse 92   Temp 97.9 °F (36.6 °C) (Temporal)   Resp 12   Ht 177.8 cm (5' 10\")   Wt 86.2 kg   SpO2 96%   BMI 27.26 kg/m²         Physical Exam  Vitals and nursing note reviewed.    Constitutional:       Appearance: He is well-devel [employer]) NRDR ([address]) which includes the Dose Index Registry. PATIENT STATED HISTORY:(As transcribed by Technologist)  Patient presents with mid upper abdominal pain for 2 days with nausea and vomiting.    CONTRAST U encounter diagnosis)     Disposition:  Discharge  12/30/2021 12:23 pm    Follow-up:  Acosta Vasquez [address]  [phone]    Call   As needed          Medications Prescribed:  Current Discharge Medica

## 2022-01-03 NOTE — PATIENT INSTRUCTIONS
Diabetes: Activity Tips    Being more active can help you manage your diabetes. The tips on this sheet can help you get the most from your exercise. They can also help you stay safe.    Staying active   It’s important for adults to spend less time sitting being active. Test your blood sugar before exercising if you take insulin or medicine that can cause low blood sugar. And carry a fast-acting sugar that will raise your blood sugar level quickly. This includes glucose tablets or glucose gel in a tube.  Use and bad cholesterol  Lipids are fats, and blood is mostly water. Fat and water don't mix. So our bodies need lipoproteins (lipids inside a protein shell) to carry the lipids.  The protein shell carries its lipids through the bloodstream. There are two main activity you enjoy. Walking, swimming, and riding a bike are some good ways to be active. · Start at a level where you feel comfortable. Increase your time and pace a little each week.   · Work up to 30 to 40 minutes of moderate to high intensity physical feelings may occur for no clear reason. And they tend to happen again and again. They may prevent you from coping with life. They may cause you great distress. You may then stay away from anything that triggers your fear.  In extreme cases, you may never le professional's instructions.

## 2022-01-03 NOTE — PROGRESS NOTES
Subjective     HPI:   Hudson Stone verbally consents to a Virtual/Telephone Check-In service on 01/03/22. Patient understands and accepts financial responsibility for any deductible, co-insurance and/or co-pays associated with this service.  This visit is Grossly normal and age appropriate, normal and logical connections        Assessment    Diagnoses and all orders for this visit:    Generalized anxiety disorder  Patient presents for follow up of anxiety  - doing well  - taking hydroxyzine 10mg as needed w

## 2022-02-03 ENCOUNTER — TELEPHONE (OUTPATIENT)
Dept: FAMILY MEDICINE CLINIC | Facility: CLINIC | Age: 52
End: 2022-02-03

## 2022-03-08 RX ORDER — ESCITALOPRAM OXALATE 20 MG/1
TABLET ORAL
Qty: 90 TABLET | Refills: 0 | Status: SHIPPED | OUTPATIENT
Start: 2022-03-08 | End: 2022-03-11 | Stop reason: DRUGHIGH

## 2022-03-11 RX ORDER — ESCITALOPRAM OXALATE 20 MG/1
30 TABLET ORAL DAILY
Qty: 135 TABLET | Refills: 0 | Status: SHIPPED | OUTPATIENT
Start: 2022-03-11 | End: 2022-06-09

## 2022-03-11 NOTE — TELEPHONE ENCOUNTER
Received paperwork from pharmacy stating that patient has been taking lexapro 30mg daily, so he will need a new prescription. Reviewed records and saw that patient was take lexapro 20mg 1.5 tablets daily, so will refill to have enough of the proper dose. Patient has been stable on lexapro 30mg daily.

## 2022-03-19 ENCOUNTER — TELEPHONE (OUTPATIENT)
Dept: FAMILY MEDICINE CLINIC | Facility: CLINIC | Age: 52
End: 2022-03-19

## 2022-04-19 ENCOUNTER — TELEPHONE (OUTPATIENT)
Dept: FAMILY MEDICINE CLINIC | Facility: CLINIC | Age: 52
End: 2022-04-19

## 2022-04-19 NOTE — TELEPHONE ENCOUNTER
Medical Record Request Received    Date received in office: 4/18/2022    Requested from: [facility]    Records to be sent to: Ruf[facility]  [facility]#[phone]           Date request sent to [employer]:4/19/2022

## 2022-04-21 ENCOUNTER — LAB ENCOUNTER (OUTPATIENT)
Dept: LAB | Age: 52
End: 2022-04-21
Attending: STUDENT IN AN ORGANIZED HEALTH CARE EDUCATION/TRAINING PROGRAM
Payer: COMMERCIAL

## 2022-04-21 DIAGNOSIS — E11.9 DIET-CONTROLLED TYPE 2 DIABETES MELLITUS (HCC): ICD-10-CM

## 2022-04-21 DIAGNOSIS — E78.2 MIXED HYPERLIPIDEMIA: ICD-10-CM

## 2022-04-21 DIAGNOSIS — I10 ESSENTIAL HYPERTENSION: ICD-10-CM

## 2022-04-21 LAB
ALBUMIN SERPL-MCNC: 3.7 G/DL (ref 3.4–5)
ALBUMIN/GLOB SERPL: 1.2 {RATIO} (ref 1–2)
ALP LIVER SERPL-CCNC: 71 U/L
ALT SERPL-CCNC: 71 U/L
ANION GAP SERPL CALC-SCNC: 7 MMOL/L (ref 0–18)
AST SERPL-CCNC: 27 U/L (ref 15–37)
BILIRUB SERPL-MCNC: 1.2 MG/DL (ref 0.1–2)
BUN BLD-MCNC: 15 MG/DL (ref 7–18)
CALCIUM BLD-MCNC: 8.7 MG/DL (ref 8.5–10.1)
CHLORIDE SERPL-SCNC: 104 MMOL/L (ref 98–112)
CHOLEST SERPL-MCNC: 148 MG/DL (ref ?–200)
CO2 SERPL-SCNC: 27 MMOL/L (ref 21–32)
CREAT BLD-MCNC: 0.97 MG/DL
CREAT UR-SCNC: 262 MG/DL
EST. AVERAGE GLUCOSE BLD GHB EST-MCNC: 148 MG/DL (ref 68–126)
FASTING PATIENT LIPID ANSWER: YES
FASTING STATUS PATIENT QL REPORTED: YES
GLOBULIN PLAS-MCNC: 3.2 G/DL (ref 2.8–4.4)
GLUCOSE BLD-MCNC: 136 MG/DL (ref 70–99)
HBA1C MFR BLD: 6.8 % (ref ?–5.7)
HDLC SERPL-MCNC: 46 MG/DL (ref 40–59)
LDLC SERPL CALC-MCNC: 83 MG/DL (ref ?–100)
MICROALBUMIN UR-MCNC: 0.69 MG/DL
MICROALBUMIN/CREAT 24H UR-RTO: 2.6 UG/MG (ref ?–30)
NONHDLC SERPL-MCNC: 102 MG/DL (ref ?–130)
OSMOLALITY SERPL CALC.SUM OF ELEC: 289 MOSM/KG (ref 275–295)
POTASSIUM SERPL-SCNC: 4.6 MMOL/L (ref 3.5–5.1)
PROT SERPL-MCNC: 6.9 G/DL (ref 6.4–8.2)
SODIUM SERPL-SCNC: 138 MMOL/L (ref 136–145)
TRIGL SERPL-MCNC: 105 MG/DL (ref 30–149)
VLDLC SERPL CALC-MCNC: 17 MG/DL (ref 0–30)

## 2022-04-21 PROCEDURE — 80061 LIPID PANEL: CPT

## 2022-04-21 PROCEDURE — 82570 ASSAY OF URINE CREATININE: CPT

## 2022-04-21 PROCEDURE — 36415 COLL VENOUS BLD VENIPUNCTURE: CPT

## 2022-04-21 PROCEDURE — 82043 UR ALBUMIN QUANTITATIVE: CPT

## 2022-04-21 PROCEDURE — 3044F HG A1C LEVEL LT 7.0%: CPT | Performed by: STUDENT IN AN ORGANIZED HEALTH CARE EDUCATION/TRAINING PROGRAM

## 2022-04-21 PROCEDURE — 83036 HEMOGLOBIN GLYCOSYLATED A1C: CPT

## 2022-04-21 PROCEDURE — 80053 COMPREHEN METABOLIC PANEL: CPT

## 2022-04-21 PROCEDURE — 3061F NEG MICROALBUMINURIA REV: CPT | Performed by: STUDENT IN AN ORGANIZED HEALTH CARE EDUCATION/TRAINING PROGRAM

## 2022-05-18 ENCOUNTER — OFFICE VISIT (OUTPATIENT)
Dept: FAMILY MEDICINE CLINIC | Facility: CLINIC | Age: 52
End: 2022-05-18
Payer: COMMERCIAL

## 2022-05-18 VITALS
DIASTOLIC BLOOD PRESSURE: 72 MMHG | BODY MASS INDEX: 28.06 KG/M2 | RESPIRATION RATE: 20 BRPM | TEMPERATURE: 98 F | HEART RATE: 82 BPM | SYSTOLIC BLOOD PRESSURE: 120 MMHG | HEIGHT: 70 IN | WEIGHT: 196 LBS

## 2022-05-18 DIAGNOSIS — E78.2 MIXED HYPERLIPIDEMIA: ICD-10-CM

## 2022-05-18 DIAGNOSIS — Z13.29 SCREENING FOR THYROID DISORDER: ICD-10-CM

## 2022-05-18 DIAGNOSIS — E11.9 DIET-CONTROLLED DIABETES MELLITUS (HCC): Primary | ICD-10-CM

## 2022-05-18 DIAGNOSIS — I10 ESSENTIAL HYPERTENSION: ICD-10-CM

## 2022-05-18 DIAGNOSIS — F41.1 GENERALIZED ANXIETY DISORDER: ICD-10-CM

## 2022-05-18 DIAGNOSIS — Z23 NEED FOR VACCINATION: ICD-10-CM

## 2022-05-18 DIAGNOSIS — Z12.5 SCREENING FOR PROSTATE CANCER: ICD-10-CM

## 2022-05-18 DIAGNOSIS — E11.9 CONTROLLED TYPE 2 DIABETES MELLITUS WITHOUT COMPLICATION, WITHOUT LONG-TERM CURRENT USE OF INSULIN (HCC): ICD-10-CM

## 2022-05-18 PROCEDURE — 90677 PCV20 VACCINE IM: CPT | Performed by: STUDENT IN AN ORGANIZED HEALTH CARE EDUCATION/TRAINING PROGRAM

## 2022-05-18 PROCEDURE — 3008F BODY MASS INDEX DOCD: CPT | Performed by: STUDENT IN AN ORGANIZED HEALTH CARE EDUCATION/TRAINING PROGRAM

## 2022-05-18 PROCEDURE — 3074F SYST BP LT 130 MM HG: CPT | Performed by: STUDENT IN AN ORGANIZED HEALTH CARE EDUCATION/TRAINING PROGRAM

## 2022-05-18 PROCEDURE — 99214 OFFICE O/P EST MOD 30 MIN: CPT | Performed by: STUDENT IN AN ORGANIZED HEALTH CARE EDUCATION/TRAINING PROGRAM

## 2022-05-18 PROCEDURE — 90471 IMMUNIZATION ADMIN: CPT | Performed by: STUDENT IN AN ORGANIZED HEALTH CARE EDUCATION/TRAINING PROGRAM

## 2022-05-18 PROCEDURE — 3078F DIAST BP <80 MM HG: CPT | Performed by: STUDENT IN AN ORGANIZED HEALTH CARE EDUCATION/TRAINING PROGRAM

## 2022-05-18 RX ORDER — HYDROXYZINE HYDROCHLORIDE 10 MG/1
TABLET, FILM COATED ORAL
Qty: 60 TABLET | Refills: 1 | Status: CANCELLED | OUTPATIENT
Start: 2022-05-18

## 2022-05-18 RX ORDER — LOSARTAN POTASSIUM 50 MG/1
50 TABLET ORAL DAILY
Qty: 90 TABLET | Refills: 1 | Status: CANCELLED | OUTPATIENT
Start: 2022-05-18

## 2022-05-18 RX ORDER — BLOOD SUGAR DIAGNOSTIC
STRIP MISCELLANEOUS
Qty: 200 STRIP | Refills: 1 | Status: SHIPPED | OUTPATIENT
Start: 2022-05-18

## 2022-05-18 RX ORDER — ATORVASTATIN CALCIUM 10 MG/1
TABLET, FILM COATED ORAL
Qty: 45 TABLET | Refills: 1 | Status: CANCELLED | OUTPATIENT
Start: 2022-05-18

## 2022-05-18 RX ORDER — ESCITALOPRAM OXALATE 20 MG/1
30 TABLET ORAL DAILY
Qty: 135 TABLET | Refills: 1 | Status: CANCELLED | OUTPATIENT
Start: 2022-05-18

## 2022-06-09 DIAGNOSIS — F41.1 GENERALIZED ANXIETY DISORDER: ICD-10-CM

## 2022-06-09 RX ORDER — ESCITALOPRAM OXALATE 20 MG/1
30 TABLET ORAL DAILY
Qty: 135 TABLET | Refills: 1 | Status: SHIPPED | OUTPATIENT
Start: 2022-06-09

## 2022-06-09 NOTE — TELEPHONE ENCOUNTER
Please see pended medication.     Please sign and approved if appropriate       Last ov 5-18-22      Last refill 3-11-22      Upcoming appt is schedule for 11-18-22                                                                   escitalopram 20 MG Oral Tab 135 tablet 0 3/11/2022 6/9/2022

## 2022-06-16 ENCOUNTER — TELEPHONE (OUTPATIENT)
Dept: FAMILY MEDICINE CLINIC | Facility: CLINIC | Age: 52
End: 2022-06-16

## 2022-06-16 NOTE — TELEPHONE ENCOUNTER
Pt calling to update Dr Vinayak Stuart. Pt has tested positive for Covid as of today 6/16/22. Pt had rapid test completed at a clinic. Symptoms: body aches, light headache on/off, stuffy nose. Pt states had cough earlier this week - \"gone now for the most part\". Pt also notes feels like he had a moment of SOB yesterday that went away when he sat up. Advised pt to call our office back if he develops any new or worsening symptoms before he receives a call from the nurses. Pt agrees.

## 2022-06-16 NOTE — TELEPHONE ENCOUNTER
Please determine when symptoms started. Check with patient to see if he has a pulse oximeter at home with goal of >92% oxygen saturation. Advise patient to alternate resting on his stomach, sides and back. To ER if chest pain, shortness of breath, dizziness or unable to tolerate fluids. Thank you.

## 2022-06-20 NOTE — TELEPHONE ENCOUNTER
Sounds like he is doing much better. We can discontinue home monitoring calls - patient can contact the office if any change arises. Thank you.

## 2022-11-01 ENCOUNTER — LAB ENCOUNTER (OUTPATIENT)
Dept: LAB | Age: 52
End: 2022-11-01
Attending: STUDENT IN AN ORGANIZED HEALTH CARE EDUCATION/TRAINING PROGRAM
Payer: COMMERCIAL

## 2022-11-01 DIAGNOSIS — E11.9 CONTROLLED TYPE 2 DIABETES MELLITUS WITHOUT COMPLICATION, WITHOUT LONG-TERM CURRENT USE OF INSULIN (HCC): ICD-10-CM

## 2022-11-01 DIAGNOSIS — E11.9 DIET-CONTROLLED DIABETES MELLITUS (HCC): ICD-10-CM

## 2022-11-01 DIAGNOSIS — F41.1 GENERALIZED ANXIETY DISORDER: ICD-10-CM

## 2022-11-01 DIAGNOSIS — E78.2 MIXED HYPERLIPIDEMIA: ICD-10-CM

## 2022-11-01 DIAGNOSIS — Z13.29 SCREENING FOR THYROID DISORDER: ICD-10-CM

## 2022-11-01 DIAGNOSIS — I10 ESSENTIAL HYPERTENSION: ICD-10-CM

## 2022-11-01 DIAGNOSIS — Z12.5 SCREENING FOR PROSTATE CANCER: ICD-10-CM

## 2022-11-01 LAB
ALBUMIN SERPL-MCNC: 3.9 G/DL (ref 3.4–5)
ALBUMIN/GLOB SERPL: 1.2 {RATIO} (ref 1–2)
ALP LIVER SERPL-CCNC: 57 U/L
ALT SERPL-CCNC: 41 U/L
ANION GAP SERPL CALC-SCNC: 4 MMOL/L (ref 0–18)
AST SERPL-CCNC: 18 U/L (ref 15–37)
BILIRUB SERPL-MCNC: 1.2 MG/DL (ref 0.1–2)
BUN BLD-MCNC: 15 MG/DL (ref 7–18)
CALCIUM BLD-MCNC: 9 MG/DL (ref 8.5–10.1)
CHLORIDE SERPL-SCNC: 106 MMOL/L (ref 98–112)
CHOLEST SERPL-MCNC: 210 MG/DL (ref ?–200)
CO2 SERPL-SCNC: 27 MMOL/L (ref 21–32)
COMPLEXED PSA SERPL-MCNC: 0.74 NG/ML (ref ?–4)
CREAT BLD-MCNC: 0.99 MG/DL
CREAT UR-SCNC: 106 MG/DL
EST. AVERAGE GLUCOSE BLD GHB EST-MCNC: 131 MG/DL (ref 68–126)
FASTING PATIENT LIPID ANSWER: YES
FASTING STATUS PATIENT QL REPORTED: YES
GFR SERPLBLD BASED ON 1.73 SQ M-ARVRAT: 91 ML/MIN/1.73M2 (ref 60–?)
GLOBULIN PLAS-MCNC: 3.2 G/DL (ref 2.8–4.4)
GLUCOSE BLD-MCNC: 121 MG/DL (ref 70–99)
HBA1C MFR BLD: 6.2 % (ref ?–5.7)
HDLC SERPL-MCNC: 55 MG/DL (ref 40–59)
LDLC SERPL CALC-MCNC: 131 MG/DL (ref ?–100)
MICROALBUMIN UR-MCNC: 0.59 MG/DL
MICROALBUMIN/CREAT 24H UR-RTO: 5.6 UG/MG (ref ?–30)
NONHDLC SERPL-MCNC: 155 MG/DL (ref ?–130)
OSMOLALITY SERPL CALC.SUM OF ELEC: 286 MOSM/KG (ref 275–295)
POTASSIUM SERPL-SCNC: 4.3 MMOL/L (ref 3.5–5.1)
PROT SERPL-MCNC: 7.1 G/DL (ref 6.4–8.2)
SODIUM SERPL-SCNC: 137 MMOL/L (ref 136–145)
T4 FREE SERPL-MCNC: 0.9 NG/DL (ref 0.8–1.7)
TRIGL SERPL-MCNC: 138 MG/DL (ref 30–149)
TSI SER-ACNC: 1.87 MIU/ML (ref 0.36–3.74)
VLDLC SERPL CALC-MCNC: 25 MG/DL (ref 0–30)

## 2022-11-01 PROCEDURE — 36415 COLL VENOUS BLD VENIPUNCTURE: CPT

## 2022-11-01 PROCEDURE — 84439 ASSAY OF FREE THYROXINE: CPT

## 2022-11-01 PROCEDURE — 3044F HG A1C LEVEL LT 7.0%: CPT | Performed by: STUDENT IN AN ORGANIZED HEALTH CARE EDUCATION/TRAINING PROGRAM

## 2022-11-01 PROCEDURE — 3061F NEG MICROALBUMINURIA REV: CPT | Performed by: STUDENT IN AN ORGANIZED HEALTH CARE EDUCATION/TRAINING PROGRAM

## 2022-11-01 PROCEDURE — 84443 ASSAY THYROID STIM HORMONE: CPT

## 2022-11-01 PROCEDURE — 80061 LIPID PANEL: CPT

## 2022-11-01 PROCEDURE — 82043 UR ALBUMIN QUANTITATIVE: CPT

## 2022-11-01 PROCEDURE — 82570 ASSAY OF URINE CREATININE: CPT

## 2022-11-01 PROCEDURE — 83036 HEMOGLOBIN GLYCOSYLATED A1C: CPT

## 2022-11-01 PROCEDURE — 80053 COMPREHEN METABOLIC PANEL: CPT

## 2022-11-01 NOTE — TELEPHONE ENCOUNTER
Pt would like Primary Care Provider notified of admission.   Medical Record Request Received    Date received in office: 3-19-22    Requested from:[facility]     Records to be sent to: [address] [place] [address]           Date request sent to [employer]: 3-21-22

## 2022-11-10 ENCOUNTER — TELEPHONE (OUTPATIENT)
Dept: FAMILY MEDICINE CLINIC | Facility: CLINIC | Age: 52
End: 2022-11-10

## 2022-11-10 PROCEDURE — 99283 EMERGENCY DEPT VISIT LOW MDM: CPT

## 2022-11-10 PROCEDURE — 93010 ELECTROCARDIOGRAM REPORT: CPT

## 2022-11-10 RX ORDER — LOSARTAN POTASSIUM 100 MG/1
100 TABLET ORAL DAILY
Qty: 30 TABLET | Refills: 0 | COMMUNITY
Start: 2022-11-10

## 2022-11-10 NOTE — TELEPHONE ENCOUNTER
I spoke with Nikita Horn. She advised the patient to increase his dose of Losartan to 100 mg daily. Pt already took 50 mg this am so I instructed him to take another 50 mg. Then tomorrow morning he can take two of the losartan 50 mg = 100 mg dose. An appt was made for the pt to see Dr. Carlos Kong at 7:00 am tomorrow. If he develops any chest pain, shortness of breath nausea, shaking, headaches or nausea he is to go to the ER. I advised pt to monitor his BP and bring it to his appt Pt.  Agreed to plan and verbalized understanding

## 2022-11-10 NOTE — TELEPHONE ENCOUNTER
Pt took bp it was 144/110 and then 157/97. He is lightheaded and his face felt a little tingle. Based on symptoms described by patient, it was determined that live call be transferred to a triage nurse for further evaluation.

## 2022-11-11 ENCOUNTER — HOSPITAL ENCOUNTER (EMERGENCY)
Age: 52
Discharge: HOME | End: 2022-11-11
Attending: EMERGENCY MEDICINE
Payer: COMMERCIAL

## 2022-11-11 ENCOUNTER — OFFICE VISIT (OUTPATIENT)
Dept: FAMILY MEDICINE CLINIC | Facility: CLINIC | Age: 52
End: 2022-11-11
Payer: COMMERCIAL

## 2022-11-11 VITALS
WEIGHT: 185 LBS | TEMPERATURE: 98 F | HEIGHT: 69 IN | OXYGEN SATURATION: 98 % | HEART RATE: 71 BPM | BODY MASS INDEX: 27.4 KG/M2 | RESPIRATION RATE: 16 BRPM | SYSTOLIC BLOOD PRESSURE: 129 MMHG | DIASTOLIC BLOOD PRESSURE: 80 MMHG

## 2022-11-11 VITALS — WEIGHT: 184 LBS | BODY MASS INDEX: 27.25 KG/M2 | HEIGHT: 69 IN

## 2022-11-11 DIAGNOSIS — R09.81 SINUS CONGESTION: ICD-10-CM

## 2022-11-11 DIAGNOSIS — I10 ESSENTIAL HYPERTENSION: Primary | ICD-10-CM

## 2022-11-11 DIAGNOSIS — F41.1 GENERALIZED ANXIETY DISORDER: ICD-10-CM

## 2022-11-11 DIAGNOSIS — E78.2 MIXED HYPERLIPIDEMIA: ICD-10-CM

## 2022-11-11 DIAGNOSIS — R03.0 ELEVATED BLOOD PRESSURE READING: Primary | ICD-10-CM

## 2022-11-11 LAB
ATRIAL RATE: 66 BPM
P AXIS: 67 DEGREES
P-R INTERVAL: 162 MS
Q-T INTERVAL: 398 MS
QRS DURATION: 100 MS
QTC CALCULATION (BEZET): 417 MS
R AXIS: 76 DEGREES
T AXIS: 32 DEGREES
VENTRICULAR RATE: 66 BPM

## 2022-11-11 PROCEDURE — 99214 OFFICE O/P EST MOD 30 MIN: CPT | Performed by: STUDENT IN AN ORGANIZED HEALTH CARE EDUCATION/TRAINING PROGRAM

## 2022-11-11 PROCEDURE — 3008F BODY MASS INDEX DOCD: CPT | Performed by: STUDENT IN AN ORGANIZED HEALTH CARE EDUCATION/TRAINING PROGRAM

## 2022-11-11 RX ORDER — PROPRANOLOL HYDROCHLORIDE 20 MG/1
20 TABLET ORAL 2 TIMES DAILY
Qty: 60 TABLET | Refills: 0 | Status: SHIPPED | OUTPATIENT
Start: 2022-11-11

## 2022-11-11 RX ORDER — FLUTICASONE PROPIONATE 50 MCG
2 SPRAY, SUSPENSION (ML) NASAL DAILY
Qty: 15.8 ML | Refills: 3 | Status: SHIPPED | OUTPATIENT
Start: 2022-11-11

## 2022-11-11 NOTE — DISCHARGE INSTRUCTIONS
Please follow-up with your primary care physician 1-2 days return to the ER if your symptoms worsen progress or if you have any further concerns.

## 2022-11-11 NOTE — ED INITIAL ASSESSMENT (HPI)
Patient had an anxiety attack this morning. Called his physician and took an extra dose of 50 mg Losartan. Patient to see his PMD tomorrow. This evening bp was 162/103. Patient now having \"burning\" sensation in stomach but admits to eating something spicy.

## 2022-11-14 DIAGNOSIS — I10 ESSENTIAL HYPERTENSION: Primary | ICD-10-CM

## 2022-11-14 RX ORDER — LOSARTAN POTASSIUM 50 MG/1
TABLET ORAL
Qty: 90 TABLET | Refills: 0 | OUTPATIENT
Start: 2022-11-14

## 2022-11-14 RX ORDER — LOSARTAN POTASSIUM 100 MG/1
100 TABLET ORAL DAILY
Qty: 90 TABLET | Refills: 0 | Status: SHIPPED | OUTPATIENT
Start: 2022-11-14

## 2022-11-17 ENCOUNTER — TELEPHONE (OUTPATIENT)
Dept: FAMILY MEDICINE CLINIC | Facility: CLINIC | Age: 52
End: 2022-11-17

## 2022-11-17 NOTE — TELEPHONE ENCOUNTER
Stop propranolol. I recommend urgent care for evaluation of allergic reaction. To ER if shortness of breath, chest pain, dizziness or worsening symptoms. Thank you.

## 2022-11-17 NOTE — TELEPHONE ENCOUNTER
Pt called back and states he feels like he might feel something in his throat, but can swallow. Strongly advised pt to go to ER now and be evaluated. Advised to ask someone from his work to take him or ambulance if continues to worsen. Pt voiced understanding and agreed with the plan. Also advised of below.

## 2022-11-17 NOTE — TELEPHONE ENCOUNTER
S/W pt and he states he started Propranolol on 11/11/2022 with relief. Last night he felt a little tingling on his right side of med for a short time and resolved. Took another one this AM at 745 AM and when he got to work by 815 AM, he C/O swelling,slight itchiness and tingling sensation on the right side by his nose and cheek.

## 2022-11-17 NOTE — TELEPHONE ENCOUNTER
I don't see an ER visit, did he go to an ER outside of our system? Please check on patient. Thank you.

## 2022-11-18 NOTE — TELEPHONE ENCOUNTER
Thank you. Continue losartan 100mg daily only. Follow up as originally recommended. If recurrence to ER. If any chest pain, shortness of breath, dizziness, to go to the ER.

## 2022-11-18 NOTE — TELEPHONE ENCOUNTER
REBECCAI  only. Per pt he went to [facility] in [facility] and states that he had minor swelling and throat was fine. Was advised Benadryl as needed. Per pt he is doing much better.

## 2022-11-22 ENCOUNTER — HOSPITAL ENCOUNTER (OUTPATIENT)
Dept: CV DIAGNOSTICS | Facility: HOSPITAL | Age: 52
Discharge: HOME | End: 2022-11-22
Attending: STUDENT IN AN ORGANIZED HEALTH CARE EDUCATION/TRAINING PROGRAM
Payer: COMMERCIAL

## 2022-11-22 DIAGNOSIS — I10 ESSENTIAL HYPERTENSION: ICD-10-CM

## 2022-11-22 PROCEDURE — 93306 TTE W/DOPPLER COMPLETE: CPT | Performed by: STUDENT IN AN ORGANIZED HEALTH CARE EDUCATION/TRAINING PROGRAM

## 2022-12-12 DIAGNOSIS — F41.1 GENERALIZED ANXIETY DISORDER: ICD-10-CM

## 2022-12-13 NOTE — TELEPHONE ENCOUNTER
Escitalopram is not a protocol medication last OV 11/11/22 last refill 6/9/22 #135 + 1. Please review and refill if appropriate.   Thank you

## 2022-12-14 RX ORDER — ESCITALOPRAM OXALATE 20 MG/1
TABLET ORAL
Qty: 135 TABLET | Refills: 1 | Status: SHIPPED | OUTPATIENT
Start: 2022-12-14

## 2023-01-05 ENCOUNTER — OFFICE VISIT (OUTPATIENT)
Dept: FAMILY MEDICINE CLINIC | Facility: CLINIC | Age: 53
End: 2023-01-05
Payer: COMMERCIAL

## 2023-01-05 VITALS — HEIGHT: 67.25 IN | BODY MASS INDEX: 29.16 KG/M2 | WEIGHT: 188 LBS

## 2023-01-05 DIAGNOSIS — I10 ESSENTIAL HYPERTENSION: ICD-10-CM

## 2023-01-05 DIAGNOSIS — Z00.01 ENCOUNTER FOR ROUTINE ADULT HEALTH EXAMINATION WITH ABNORMAL FINDINGS: Primary | ICD-10-CM

## 2023-01-05 DIAGNOSIS — Z23 NEED FOR VACCINATION: ICD-10-CM

## 2023-01-05 DIAGNOSIS — F41.9 ANXIETY: ICD-10-CM

## 2023-01-05 DIAGNOSIS — E11.65 TYPE 2 DIABETES MELLITUS WITH HYPERGLYCEMIA, WITHOUT LONG-TERM CURRENT USE OF INSULIN (HCC): ICD-10-CM

## 2023-01-05 DIAGNOSIS — E66.3 OVERWEIGHT WITH BODY MASS INDEX (BMI) OF 29 TO 29.9 IN ADULT: ICD-10-CM

## 2023-01-05 PROCEDURE — 99396 PREV VISIT EST AGE 40-64: CPT | Performed by: STUDENT IN AN ORGANIZED HEALTH CARE EDUCATION/TRAINING PROGRAM

## 2023-01-05 PROCEDURE — 90686 IIV4 VACC NO PRSV 0.5 ML IM: CPT | Performed by: STUDENT IN AN ORGANIZED HEALTH CARE EDUCATION/TRAINING PROGRAM

## 2023-01-05 PROCEDURE — 90471 IMMUNIZATION ADMIN: CPT | Performed by: STUDENT IN AN ORGANIZED HEALTH CARE EDUCATION/TRAINING PROGRAM

## 2023-01-05 PROCEDURE — 99214 OFFICE O/P EST MOD 30 MIN: CPT | Performed by: STUDENT IN AN ORGANIZED HEALTH CARE EDUCATION/TRAINING PROGRAM

## 2023-01-05 PROCEDURE — 3008F BODY MASS INDEX DOCD: CPT | Performed by: STUDENT IN AN ORGANIZED HEALTH CARE EDUCATION/TRAINING PROGRAM

## 2023-04-10 ENCOUNTER — TELEMEDICINE (OUTPATIENT)
Dept: FAMILY MEDICINE CLINIC | Facility: CLINIC | Age: 53
End: 2023-04-10
Payer: COMMERCIAL

## 2023-04-10 DIAGNOSIS — J01.10 ACUTE NON-RECURRENT FRONTAL SINUSITIS: Primary | ICD-10-CM

## 2023-04-10 RX ORDER — AZITHROMYCIN 250 MG/1
TABLET, FILM COATED ORAL
Qty: 6 TABLET | Refills: 0 | Status: SHIPPED | OUTPATIENT
Start: 2023-04-10 | End: 2023-04-15

## 2023-04-10 NOTE — PROGRESS NOTES
Subjective     HPI:   Yi Morrison verbally consents to a Virtual/Telephone Check-In service on 04/10/23. Patient understands and accepts financial responsibility for any deductible, co-insurance and/or co-pays associated with this service. This visit is conducted using Telemedicine with live, interactive video and audio. I returned Yi Morrison call by secure video chat, verified date of birth, and discussed their current concerns:     Has sinus infection. Thought it was allergies at first. Has thick green mucus. Some forehead pressure. Tried coricidin. Tried flonase nasal spray. Night is the worst, very stuffy. Difficulty sleep. No cough or fever. No change in taste or smell. No chest pain or shortness of breath. No stomach issues, rashes. Chief Complaint Reviewed and Verified  No Further Nursing Notes to   Review  Tobacco Reviewed  Allergies Reviewed  Medications Reviewed    Problem List Reviewed  Medical History Reviewed  Surgical History   Reviewed  Family History Reviewed            REVIEW OF SYSTEMS:  Pertinent items are noted in HPI. Physical Exam:  alert, appears stated age, cooperative and no distress, Normocephalic, without obvious abnormality, atraumatic, Speaking in full sentences comfortably, Normal work of breathing, no abnormal pigmentation, Grossly normal and age appropriate, normal and logical connections        Assessment    Diagnoses and all orders for this visit:    Acute non-recurrent frontal sinusitis  Patient has sinus congestion appears c/w sinusitis  - check home covid test  - will treat with azithromycin if no improvement in the next 2-3 days  - continue flonase  - add an antihistamine such as zyrtec/allegra/claritin/xyzal  - saline nasal rinse at least once daily. - stay hydrated. - RTC if no improvement  -     azithromycin 250 MG Oral Tab; Take 2 tablets (500 mg total) by mouth daily for 1 day, THEN 1 tablet (250 mg total) daily for 4 days. Patient/Caregiver Education: There are no barriers to learning. Medical education done. Outcome: Patient verbalizes understanding. Patient is notified to call with any questions, complications, allergies, or worsening or changing symptoms. Patient is to call with any side effects or complications from the treatments as a result of today.      Follow up: 1 week if no improvement  Time of visit: 10 minutes      Mike Giordano MD  [facility]  04/10/23

## 2023-04-10 NOTE — PATIENT INSTRUCTIONS
Use flonase. Can add an antihistamine such as zyrtec/allegra/claritin/xyzal. Saline nasal rinse at least once daily. Stay hydrated.

## 2023-04-17 ENCOUNTER — TELEPHONE (OUTPATIENT)
Dept: FAMILY MEDICINE CLINIC | Facility: CLINIC | Age: 53
End: 2023-04-17

## 2023-04-17 DIAGNOSIS — E11.65 TYPE 2 DIABETES MELLITUS WITH HYPERGLYCEMIA, WITHOUT LONG-TERM CURRENT USE OF INSULIN (HCC): ICD-10-CM

## 2023-04-17 DIAGNOSIS — I10 ESSENTIAL HYPERTENSION: ICD-10-CM

## 2023-04-17 DIAGNOSIS — E78.2 MIXED HYPERLIPIDEMIA: ICD-10-CM

## 2023-04-17 DIAGNOSIS — Z01.00 ROUTINE EYE EXAM: Primary | ICD-10-CM

## 2023-04-17 NOTE — TELEPHONE ENCOUNTER
Pt requesting referral for the below specialist:    Specialty:Ophthalmology  Provider/Specialist:Dr. Rajwinder Pena  Ph.#[phone]    Reason/Symptoms:Routine eye exam    Has pt seen PCP for this condition? (Y/N):n    Insurance:[facility]

## 2023-04-21 ENCOUNTER — LAB ENCOUNTER (OUTPATIENT)
Dept: LAB | Age: 53
End: 2023-04-21
Attending: STUDENT IN AN ORGANIZED HEALTH CARE EDUCATION/TRAINING PROGRAM
Payer: COMMERCIAL

## 2023-04-21 DIAGNOSIS — E11.65 TYPE 2 DIABETES MELLITUS WITH HYPERGLYCEMIA, WITHOUT LONG-TERM CURRENT USE OF INSULIN (HCC): ICD-10-CM

## 2023-04-21 LAB
ALBUMIN SERPL-MCNC: 3.9 G/DL (ref 3.4–5)
ALBUMIN/GLOB SERPL: 1.1 {RATIO} (ref 1–2)
ALP LIVER SERPL-CCNC: 50 U/L
ALT SERPL-CCNC: 46 U/L
ANION GAP SERPL CALC-SCNC: 6 MMOL/L (ref 0–18)
AST SERPL-CCNC: 23 U/L (ref 15–37)
BILIRUB SERPL-MCNC: 1.1 MG/DL (ref 0.1–2)
BUN BLD-MCNC: 16 MG/DL (ref 7–18)
CALCIUM BLD-MCNC: 8.8 MG/DL (ref 8.5–10.1)
CHLORIDE SERPL-SCNC: 109 MMOL/L (ref 98–112)
CHOLEST SERPL-MCNC: 132 MG/DL (ref ?–200)
CO2 SERPL-SCNC: 24 MMOL/L (ref 21–32)
CREAT BLD-MCNC: 1 MG/DL
CREAT UR-SCNC: 88.8 MG/DL
EST. AVERAGE GLUCOSE BLD GHB EST-MCNC: 134 MG/DL (ref 68–126)
FASTING PATIENT LIPID ANSWER: YES
FASTING STATUS PATIENT QL REPORTED: YES
GFR SERPLBLD BASED ON 1.73 SQ M-ARVRAT: 89 ML/MIN/1.73M2 (ref 60–?)
GLOBULIN PLAS-MCNC: 3.7 G/DL (ref 2.8–4.4)
GLUCOSE BLD-MCNC: 102 MG/DL (ref 70–99)
HBA1C MFR BLD: 6.3 % (ref ?–5.7)
HDLC SERPL-MCNC: 49 MG/DL (ref 40–59)
LDLC SERPL CALC-MCNC: 63 MG/DL (ref ?–100)
MICROALBUMIN UR-MCNC: <0.5 MG/DL
NONHDLC SERPL-MCNC: 83 MG/DL (ref ?–130)
OSMOLALITY SERPL CALC.SUM OF ELEC: 289 MOSM/KG (ref 275–295)
POTASSIUM SERPL-SCNC: 4.3 MMOL/L (ref 3.5–5.1)
PROT SERPL-MCNC: 7.6 G/DL (ref 6.4–8.2)
SODIUM SERPL-SCNC: 139 MMOL/L (ref 136–145)
TRIGL SERPL-MCNC: 107 MG/DL (ref 30–149)
VLDLC SERPL CALC-MCNC: 16 MG/DL (ref 0–30)

## 2023-04-21 PROCEDURE — 36415 COLL VENOUS BLD VENIPUNCTURE: CPT

## 2023-04-21 PROCEDURE — 80053 COMPREHEN METABOLIC PANEL: CPT

## 2023-04-21 PROCEDURE — 82043 UR ALBUMIN QUANTITATIVE: CPT

## 2023-04-21 PROCEDURE — 3044F HG A1C LEVEL LT 7.0%: CPT | Performed by: STUDENT IN AN ORGANIZED HEALTH CARE EDUCATION/TRAINING PROGRAM

## 2023-04-21 PROCEDURE — 82570 ASSAY OF URINE CREATININE: CPT

## 2023-04-21 PROCEDURE — 80061 LIPID PANEL: CPT

## 2023-04-21 PROCEDURE — 3061F NEG MICROALBUMINURIA REV: CPT | Performed by: STUDENT IN AN ORGANIZED HEALTH CARE EDUCATION/TRAINING PROGRAM

## 2023-04-21 PROCEDURE — 83036 HEMOGLOBIN GLYCOSYLATED A1C: CPT

## 2023-05-04 DIAGNOSIS — I10 ESSENTIAL HYPERTENSION: ICD-10-CM

## 2023-05-04 RX ORDER — LOSARTAN POTASSIUM 100 MG/1
TABLET ORAL
Qty: 90 TABLET | Refills: 0 | Status: SHIPPED | OUTPATIENT
Start: 2023-05-04

## 2023-06-09 DIAGNOSIS — F41.1 GENERALIZED ANXIETY DISORDER: ICD-10-CM

## 2023-06-09 RX ORDER — ESCITALOPRAM OXALATE 20 MG/1
30 TABLET ORAL DAILY
Qty: 135 TABLET | Refills: 0 | Status: SHIPPED | OUTPATIENT
Start: 2023-06-09

## 2023-08-03 DIAGNOSIS — I10 ESSENTIAL HYPERTENSION: ICD-10-CM

## 2023-08-07 RX ORDER — LOSARTAN POTASSIUM 100 MG/1
100 TABLET ORAL DAILY
Qty: 30 TABLET | Refills: 0 | Status: SHIPPED | OUTPATIENT
Start: 2023-08-07 | End: 2023-09-06

## 2023-08-07 NOTE — TELEPHONE ENCOUNTER
LOV: 01/05/2023  Next OV:   Return in about 6 months (around 7/5/2023) for med check. Last Refill:  Medication Quantity Refills Start End   LOSARTAN 100 MG Oral Tab 90 tablet 0 5/4/2023    Sig:   TAKE ONE TABLET BY MOUTH DAILY     Route:   (none)       Mcm sent to pt to schedule med f/u appt. .      Please authorize if acceptable. Thank you!

## 2023-08-30 DIAGNOSIS — E11.9 DIET-CONTROLLED DIABETES MELLITUS (HCC): ICD-10-CM

## 2023-08-30 DIAGNOSIS — E78.2 MIXED HYPERLIPIDEMIA: ICD-10-CM

## 2023-08-30 DIAGNOSIS — E11.9 CONTROLLED TYPE 2 DIABETES MELLITUS WITHOUT COMPLICATION, WITHOUT LONG-TERM CURRENT USE OF INSULIN (HCC): ICD-10-CM

## 2023-08-30 RX ORDER — ATORVASTATIN CALCIUM 10 MG/1
TABLET, FILM COATED ORAL
Qty: 45 TABLET | Refills: 0 | Status: SHIPPED | OUTPATIENT
Start: 2023-08-30 | End: 2023-09-01

## 2023-08-30 RX ORDER — BLOOD SUGAR DIAGNOSTIC
STRIP MISCELLANEOUS
Qty: 25 STRIP | Refills: 0 | Status: SHIPPED | OUTPATIENT
Start: 2023-08-30 | End: 2023-09-01

## 2023-09-01 ENCOUNTER — OFFICE VISIT (OUTPATIENT)
Dept: FAMILY MEDICINE CLINIC | Facility: CLINIC | Age: 53
End: 2023-09-01
Payer: COMMERCIAL

## 2023-09-01 VITALS
HEIGHT: 68.31 IN | BODY MASS INDEX: 28.77 KG/M2 | TEMPERATURE: 99 F | DIASTOLIC BLOOD PRESSURE: 82 MMHG | HEART RATE: 86 BPM | RESPIRATION RATE: 16 BRPM | WEIGHT: 192 LBS | SYSTOLIC BLOOD PRESSURE: 124 MMHG

## 2023-09-01 DIAGNOSIS — E11.9 CONTROLLED TYPE 2 DIABETES MELLITUS WITHOUT COMPLICATION, WITHOUT LONG-TERM CURRENT USE OF INSULIN (HCC): ICD-10-CM

## 2023-09-01 DIAGNOSIS — I10 ESSENTIAL HYPERTENSION: ICD-10-CM

## 2023-09-01 DIAGNOSIS — F41.1 GENERALIZED ANXIETY DISORDER: ICD-10-CM

## 2023-09-01 DIAGNOSIS — E78.2 MIXED HYPERLIPIDEMIA: ICD-10-CM

## 2023-09-01 DIAGNOSIS — E11.9 DIET-CONTROLLED DIABETES MELLITUS (HCC): Primary | ICD-10-CM

## 2023-09-01 DIAGNOSIS — I83.893 VARICOSE VEINS OF BOTH LEGS WITH EDEMA: ICD-10-CM

## 2023-09-01 DIAGNOSIS — R00.2 INTERMITTENT PALPITATIONS: ICD-10-CM

## 2023-09-01 PROCEDURE — 3079F DIAST BP 80-89 MM HG: CPT | Performed by: STUDENT IN AN ORGANIZED HEALTH CARE EDUCATION/TRAINING PROGRAM

## 2023-09-01 PROCEDURE — 3074F SYST BP LT 130 MM HG: CPT | Performed by: STUDENT IN AN ORGANIZED HEALTH CARE EDUCATION/TRAINING PROGRAM

## 2023-09-01 PROCEDURE — 99214 OFFICE O/P EST MOD 30 MIN: CPT | Performed by: STUDENT IN AN ORGANIZED HEALTH CARE EDUCATION/TRAINING PROGRAM

## 2023-09-01 PROCEDURE — 3072F LOW RISK FOR RETINOPATHY: CPT | Performed by: STUDENT IN AN ORGANIZED HEALTH CARE EDUCATION/TRAINING PROGRAM

## 2023-09-01 PROCEDURE — 3008F BODY MASS INDEX DOCD: CPT | Performed by: STUDENT IN AN ORGANIZED HEALTH CARE EDUCATION/TRAINING PROGRAM

## 2023-09-01 RX ORDER — BLOOD SUGAR DIAGNOSTIC
STRIP MISCELLANEOUS
Qty: 90 STRIP | Refills: 1 | Status: SHIPPED | OUTPATIENT
Start: 2023-09-01

## 2023-09-01 RX ORDER — ATORVASTATIN CALCIUM 10 MG/1
10 TABLET, FILM COATED ORAL
Qty: 36 TABLET | Refills: 1 | Status: SHIPPED | OUTPATIENT
Start: 2023-09-01

## 2023-09-01 RX ORDER — LOSARTAN POTASSIUM 100 MG/1
100 TABLET ORAL DAILY
Qty: 90 TABLET | Refills: 1 | Status: SHIPPED | OUTPATIENT
Start: 2023-09-01

## 2023-09-01 RX ORDER — BUSPIRONE HYDROCHLORIDE 5 MG/1
5 TABLET ORAL 2 TIMES DAILY
Qty: 180 TABLET | Refills: 1 | Status: SHIPPED | OUTPATIENT
Start: 2023-09-01

## 2023-09-01 RX ORDER — ESCITALOPRAM OXALATE 20 MG/1
30 TABLET ORAL DAILY
Qty: 135 TABLET | Refills: 1 | Status: SHIPPED | OUTPATIENT
Start: 2023-09-01

## 2023-09-04 DIAGNOSIS — I10 ESSENTIAL HYPERTENSION: ICD-10-CM

## 2023-09-06 RX ORDER — LOSARTAN POTASSIUM 100 MG/1
100 TABLET ORAL DAILY
Qty: 30 TABLET | Refills: 0 | OUTPATIENT
Start: 2023-09-06

## 2023-09-06 NOTE — TELEPHONE ENCOUNTER
LOV: 09/01/2023  Next OV: Return in about 12 weeks (around 11/24/2023) for medication follow up or sooner if needed. Last Refill:  Medication Quantity Refills Start End   losartan 100 MG Oral Tab 90 tablet 1 9/1/2023    Sig:   Take 1 tablet (100 mg total) by mouth daily. Route:   Oral     Order #:   [id number]         Refill request already responded to by other means.

## 2023-09-08 ENCOUNTER — HOSPITAL ENCOUNTER (OUTPATIENT)
Dept: CV DIAGNOSTICS | Facility: HOSPITAL | Age: 53
Discharge: HOME | End: 2023-09-08
Attending: STUDENT IN AN ORGANIZED HEALTH CARE EDUCATION/TRAINING PROGRAM
Payer: COMMERCIAL

## 2023-09-08 DIAGNOSIS — R00.2 INTERMITTENT PALPITATIONS: ICD-10-CM

## 2023-09-08 PROCEDURE — 93247 EXT ECG>7D<15D SCAN A/R: CPT | Performed by: STUDENT IN AN ORGANIZED HEALTH CARE EDUCATION/TRAINING PROGRAM

## 2023-09-08 PROCEDURE — 93246 EXT ECG>7D<15D RECORDING: CPT | Performed by: STUDENT IN AN ORGANIZED HEALTH CARE EDUCATION/TRAINING PROGRAM

## 2023-09-14 ENCOUNTER — HOSPITAL ENCOUNTER (OUTPATIENT)
Dept: ULTRASOUND IMAGING | Age: 53
Discharge: HOME | End: 2023-09-14
Attending: STUDENT IN AN ORGANIZED HEALTH CARE EDUCATION/TRAINING PROGRAM
Payer: COMMERCIAL

## 2023-09-14 DIAGNOSIS — I83.893 VARICOSE VEINS OF BOTH LEGS WITH EDEMA: ICD-10-CM

## 2023-09-14 PROCEDURE — 93970 EXTREMITY STUDY: CPT | Performed by: STUDENT IN AN ORGANIZED HEALTH CARE EDUCATION/TRAINING PROGRAM

## 2023-09-15 DIAGNOSIS — I83.893 VARICOSE VEINS OF BOTH LEGS WITH EDEMA: Primary | ICD-10-CM

## 2023-09-15 DIAGNOSIS — I87.2 VENOUS INSUFFICIENCY OF BOTH LOWER EXTREMITIES: ICD-10-CM

## 2023-12-08 ENCOUNTER — LAB ENCOUNTER (OUTPATIENT)
Dept: LAB | Age: 53
End: 2023-12-08
Attending: STUDENT IN AN ORGANIZED HEALTH CARE EDUCATION/TRAINING PROGRAM
Payer: COMMERCIAL

## 2023-12-08 DIAGNOSIS — E11.9 DIET-CONTROLLED DIABETES MELLITUS (HCC): ICD-10-CM

## 2023-12-08 LAB
ALBUMIN SERPL-MCNC: 3.9 G/DL (ref 3.4–5)
ALBUMIN/GLOB SERPL: 1.2 {RATIO} (ref 1–2)
ALP LIVER SERPL-CCNC: 50 U/L
ALT SERPL-CCNC: 37 U/L
ANION GAP SERPL CALC-SCNC: 5 MMOL/L (ref 0–18)
AST SERPL-CCNC: 20 U/L (ref 15–37)
BILIRUB SERPL-MCNC: 1.1 MG/DL (ref 0.1–2)
BUN BLD-MCNC: 20 MG/DL (ref 9–23)
CALCIUM BLD-MCNC: 9.3 MG/DL (ref 8.5–10.1)
CHLORIDE SERPL-SCNC: 107 MMOL/L (ref 98–112)
CHOLEST SERPL-MCNC: 173 MG/DL (ref ?–200)
CO2 SERPL-SCNC: 27 MMOL/L (ref 21–32)
CREAT BLD-MCNC: 1.05 MG/DL
CREAT UR-SCNC: 129 MG/DL
EGFRCR SERPLBLD CKD-EPI 2021: 84 ML/MIN/1.73M2 (ref 60–?)
EST. AVERAGE GLUCOSE BLD GHB EST-MCNC: 128 MG/DL (ref 68–126)
FASTING PATIENT LIPID ANSWER: YES
FASTING STATUS PATIENT QL REPORTED: YES
GLOBULIN PLAS-MCNC: 3.3 G/DL (ref 2.8–4.4)
GLUCOSE BLD-MCNC: 95 MG/DL (ref 70–99)
HBA1C MFR BLD: 6.1 % (ref ?–5.7)
HDLC SERPL-MCNC: 49 MG/DL (ref 40–59)
LDLC SERPL CALC-MCNC: 104 MG/DL (ref ?–100)
MICROALBUMIN UR-MCNC: 0.57 MG/DL
MICROALBUMIN/CREAT 24H UR-RTO: 4.4 UG/MG (ref ?–30)
NONHDLC SERPL-MCNC: 124 MG/DL (ref ?–130)
OSMOLALITY SERPL CALC.SUM OF ELEC: 290 MOSM/KG (ref 275–295)
POTASSIUM SERPL-SCNC: 4.2 MMOL/L (ref 3.5–5.1)
PROT SERPL-MCNC: 7.2 G/DL (ref 6.4–8.2)
SODIUM SERPL-SCNC: 139 MMOL/L (ref 136–145)
TRIGL SERPL-MCNC: 108 MG/DL (ref 30–149)
VLDLC SERPL CALC-MCNC: 18 MG/DL (ref 0–30)

## 2023-12-08 PROCEDURE — 82043 UR ALBUMIN QUANTITATIVE: CPT

## 2023-12-08 PROCEDURE — 80061 LIPID PANEL: CPT

## 2023-12-08 PROCEDURE — 83036 HEMOGLOBIN GLYCOSYLATED A1C: CPT

## 2023-12-08 PROCEDURE — 36415 COLL VENOUS BLD VENIPUNCTURE: CPT

## 2023-12-08 PROCEDURE — 80053 COMPREHEN METABOLIC PANEL: CPT

## 2023-12-08 PROCEDURE — 82570 ASSAY OF URINE CREATININE: CPT

## 2024-02-01 DIAGNOSIS — E78.2 MIXED HYPERLIPIDEMIA: ICD-10-CM

## 2024-02-02 RX ORDER — ATORVASTATIN CALCIUM 10 MG/1
10 TABLET, FILM COATED ORAL
Qty: 12 TABLET | Refills: 0 | Status: SHIPPED | OUTPATIENT
Start: 2024-02-02

## 2024-02-07 DIAGNOSIS — F41.1 GENERALIZED ANXIETY DISORDER: ICD-10-CM

## 2024-02-08 RX ORDER — ESCITALOPRAM OXALATE 20 MG/1
30 TABLET ORAL DAILY
Qty: 135 TABLET | Refills: 0 | Status: SHIPPED | OUTPATIENT
Start: 2024-02-08 | End: 2024-03-07

## 2024-02-08 NOTE — TELEPHONE ENCOUNTER
Over due for med-check. Please call and schedule then route to Dr. Akins.    Not per  protocol  Last office visit 9/1/2023  Last refill was: 135 tabs x1  Next appointment: overdue    Please sign pended medication if appropriate

## 2024-03-04 DIAGNOSIS — I10 ESSENTIAL HYPERTENSION: ICD-10-CM

## 2024-03-04 DIAGNOSIS — E78.2 MIXED HYPERLIPIDEMIA: ICD-10-CM

## 2024-03-07 ENCOUNTER — OFFICE VISIT (OUTPATIENT)
Dept: FAMILY MEDICINE CLINIC | Facility: CLINIC | Age: 54
End: 2024-03-07
Payer: COMMERCIAL

## 2024-03-07 VITALS
BODY MASS INDEX: 28.64 KG/M2 | SYSTOLIC BLOOD PRESSURE: 110 MMHG | RESPIRATION RATE: 16 BRPM | HEART RATE: 78 BPM | DIASTOLIC BLOOD PRESSURE: 68 MMHG | TEMPERATURE: 97 F | HEIGHT: 68.31 IN | WEIGHT: 191.19 LBS

## 2024-03-07 DIAGNOSIS — R09.81 SINUS CONGESTION: ICD-10-CM

## 2024-03-07 DIAGNOSIS — F41.1 GENERALIZED ANXIETY DISORDER: ICD-10-CM

## 2024-03-07 DIAGNOSIS — E11.9 DIET-CONTROLLED DIABETES MELLITUS (HCC): Primary | ICD-10-CM

## 2024-03-07 DIAGNOSIS — I83.893 VARICOSE VEINS OF BOTH LEGS WITH EDEMA: ICD-10-CM

## 2024-03-07 DIAGNOSIS — Z23 NEED FOR VACCINATION: ICD-10-CM

## 2024-03-07 DIAGNOSIS — I10 ESSENTIAL HYPERTENSION: ICD-10-CM

## 2024-03-07 DIAGNOSIS — E78.2 MIXED HYPERLIPIDEMIA: ICD-10-CM

## 2024-03-07 PROCEDURE — 90471 IMMUNIZATION ADMIN: CPT | Performed by: STUDENT IN AN ORGANIZED HEALTH CARE EDUCATION/TRAINING PROGRAM

## 2024-03-07 PROCEDURE — 99214 OFFICE O/P EST MOD 30 MIN: CPT | Performed by: STUDENT IN AN ORGANIZED HEALTH CARE EDUCATION/TRAINING PROGRAM

## 2024-03-07 PROCEDURE — 90715 TDAP VACCINE 7 YRS/> IM: CPT | Performed by: STUDENT IN AN ORGANIZED HEALTH CARE EDUCATION/TRAINING PROGRAM

## 2024-03-07 RX ORDER — ESCITALOPRAM OXALATE 20 MG/1
30 TABLET ORAL DAILY
Qty: 135 TABLET | Refills: 1 | Status: SHIPPED | OUTPATIENT
Start: 2024-03-07

## 2024-03-07 RX ORDER — LOSARTAN POTASSIUM 100 MG/1
100 TABLET ORAL DAILY
Qty: 30 TABLET | Refills: 0 | OUTPATIENT
Start: 2024-03-07

## 2024-03-07 RX ORDER — FLUTICASONE PROPIONATE 50 MCG
2 SPRAY, SUSPENSION (ML) NASAL DAILY
Qty: 15.8 ML | Refills: 3 | Status: SHIPPED | OUTPATIENT
Start: 2024-03-07

## 2024-03-07 RX ORDER — HYDROXYZINE HYDROCHLORIDE 10 MG/1
TABLET, FILM COATED ORAL
Qty: 60 TABLET | Refills: 1 | Status: SHIPPED | OUTPATIENT
Start: 2024-03-07

## 2024-03-07 RX ORDER — LOSARTAN POTASSIUM 100 MG/1
100 TABLET ORAL DAILY
Qty: 90 TABLET | Refills: 1 | Status: SHIPPED | OUTPATIENT
Start: 2024-03-07

## 2024-03-07 RX ORDER — ATORVASTATIN CALCIUM 10 MG/1
10 TABLET, FILM COATED ORAL
Qty: 36 TABLET | Refills: 1 | Status: SHIPPED | OUTPATIENT
Start: 2024-03-08

## 2024-03-07 RX ORDER — ATORVASTATIN CALCIUM 10 MG/1
TABLET, FILM COATED ORAL
Qty: 12 TABLET | Refills: 0 | OUTPATIENT
Start: 2024-03-07

## 2024-03-07 NOTE — PROGRESS NOTES
[facility] Note  03/07/24    Chief complaint:   Chief Complaint   Patient presents with    Medication Follow-Up     HPI:   Solomon Sevilla is a 53 year old male who presents for medication follow up. Patient reports doing well. Family is doing well.    Got flu and covid vaccines.    Would like updated referral for varicose veins.    Has noticed he was eating a bit more than normal. Trying on work. Avoids sugar. Avoids rice. Will have beans, lentils, chickpeas.    Checks blood sugar at home and tracks on his phone. Normally 100-119. Occasionally 90-98.    Some foods elevate his sugar, such a specific pepper.     Mood was a bit down due to his dog passing away of 14 years.    Sleeping okay but is more a night owl. Will get 7-8 hours of sleep.    Feeling well on escitalopram 30mg daily. Hydroxyzine 10-20mg TID as needed but rarely. Has just in case.    Did not tolerate buspirone.     Working out 4-5 days a week. Workouts at home. Dumbbells and pushups for 1-1.5 hours. Used to walk more.     Cholesterol Meds: atorvastatin Tabs - 10 MG three times a week.    BP Meds: losartan Tabs - 100 MG , doing well. Once in a while checks and it is normal.    DM Meds:  none - diet controlled    Lab Results   Component Value Date    A1C 6.1 (H) 12/08/2023    A1C 6.3 (H) 04/21/2023    A1C 6.2 (H) 11/01/2022    A1C 6.8 (H) 04/21/2022    A1C 6.4 (H) 12/03/2021      Cholesterol: 173, done on 12/8/2023.  HDL Cholesterol: 49, done on 12/8/2023.  LDL Cholesterol: 104, done on 12/8/2023.  TriGlycerides 108, done on 12/8/2023.    Last Diabetic Eye Exam:  Last Dilated Eye Exam: 08/07/23  Eye Exam shows Diabetic Retinopathy?: No    Wt Readings from Last 6 Encounters:   03/07/24 191 lb 3.2 oz (86.7 kg)   09/01/23 192 lb (87.1 kg)   01/05/23 188 lb (85.3 kg)   11/11/22 184 lb (83.5 kg)   11/10/22 185 lb (83.9 kg)   05/18/22 196 lb (88.9 kg)       Past Medical History:   Diagnosis Date    Cryptic tonsil      Diabetes mellitus (HCC)     Essential hypertension     Gilbert's disease     in question    Hemorrhoids     High cholesterol     Impaired fasting glucose     Other abnormal glucose     Other and unspecified hyperlipidemia     and mixed hyperlipidemia    Panic disorder without agoraphobia     Pure hypercholesterolemia     Sciatica 2008    Sleep disturbance 2007     Past Surgical History:   Procedure Laterality Date    APPENDECTOMY      peritonitis    APPENDECTOMY       Allergies   Allergen Reactions    Propranolol RASH and SWELLING    Pravastatin      Sodium; weakness        [START ON 3/8/2024] atorvastatin 10 MG Oral Tab Take 1 tablet (10 mg total) by mouth 3 (three) times a week. MON, WED, FRI 36 tablet 1    escitalopram 20 MG Oral Tab Take 1.5 tablets (30 mg total) by mouth daily. 135 tablet 1    fluticasone propionate 50 MCG/ACT Nasal Suspension 2 sprays by Each Nare route daily. 15.8 mL 3    hydrOXYzine 10 MG Oral Tab TAKE 1-2 TABLETS BY MOUTH THREE TIMES A DAY AS NEEDED FOR ANXIETY 60 tablet 1    losartan 100 MG Oral Tab Take 1 tablet (100 mg total) by mouth daily. 90 tablet 1    Glucose Blood (ONETOUCH VERIO) In Vitro Strip USE TO CHECK GLUCOSE DAILY 90 strip 1    ONETOUCH DELICA LANCETS FINE Does not apply Misc Test twice daily 2 Box 3     Social History     Socioeconomic History    Marital status: Single    Number of children: 0   Occupational History    Occupation: on-site management   Tobacco Use    Smoking status: Former     Years: 8     Types: Cigarettes     Quit date: 5/3/2014     Years since quittin.8     Passive exposure: Never    Smokeless tobacco: Never    Tobacco comments:     had been 1-3 cigarettes per week   Vaping Use    Vaping Use: Never used   Substance and Sexual Activity    Alcohol use: Yes     Alcohol/week: 3.0 standard drinks of alcohol     Types: 1 Glasses of wine, 1 Cans of beer, 1 Standard drinks or equivalent per week     Comment: socially ; any type    Drug use: No   Other  Topics Concern    Caffeine Concern No     Comment: 3/4 of decaf. coffee every other day    Occupational Exposure No    Sleep Concern No    Stress Concern Yes     Comment: work related    Special Diet No     Comment: watching carbs/sugars    Exercise Yes     Comment: walks warehouse at work, walks dog (armand).     Seat Belt Yes   Social History Narrative    Has a dog.     Counseling given: Not Answered  Tobacco comments: had been 1-3 cigarettes per week    Family History   Problem Relation Age of Onset    Diabetes Paternal Uncle     Diabetes Paternal Uncle     Diabetes Paternal Uncle     Diabetes Paternal Uncle     Diabetes Maternal Aunt     Diabetes Maternal Aunt     Diabetes Other         aunts,uncles    High Cholesterol Mother     Lipids Mother     High Cholesterol Brother     Arthritis Brother     Other (Hypotension[other]) Father         diet & exercise; no contact since      Family Status   Relation Status    Paternal Unc     Paternal Unc     Paternal Unc     Paternal Unc     Mat Aunt Alive    Mat Aunt Alive    Other (Not Specified)    Mo (Not Specified)    Bro (Not Specified)    Bro (Not Specified)    Fa (Not Specified)        REVIEW OF SYSTEMS:   See HPI    EXAM:   /68 (BP Location: Left arm, Patient Position: Sitting, Cuff Size: large)   Pulse 78   Temp 97.2 °F (36.2 °C) (Temporal)   Resp 16   Ht 5' 8.31\" (1.735 m)   Wt 191 lb 3.2 oz (86.7 kg)   BMI 28.81 kg/m²  Estimated body mass index is 28.81 kg/m² as calculated from the following:    Height as of this encounter: 5' 8.31\" (1.735 m).    Weight as of this encounter: 191 lb 3.2 oz (86.7 kg).   Vital signs reviewed. Appears stated age, well groomed.  Physical Exam:  GEN:  Patient is alert and oriented x3, no apparent distress  HEAD:  Normocephalic, atraumatic  HEENT:  no scleral icterus, conjunctivae clear bilaterally, EOMI, PERRLA, OP clear  LUNGS: clear to auscultation bilaterally, no  rales/rhonchi/wheezing  HEART:  Regular rate and rhythm, normal S1/S2, no murmurs, rubs or gallops  EXTREMITIES:  Moves all extremities well  NEURO:  CN 2 - 12 grossly intact, gait normal, 2+ patellar reflexes b/l      ASSESSMENT AND PLAN:   1. Diet-controlled diabetes mellitus (HCC)  Patient presents for diabetes follow up. Doing well with diet and exercise regimen. Will recheck labs in 6 months.   - CMP in 6 months; Future  - Lipid in 6 months; Future  - Hemoglobin A1C in 6 months; Future  - Microalb/Creat Ratio, Random Urine in 6 months; Future    2. Mixed hyperlipidemia  LDL increased slightly. Discussed dietary changes and incorporate aerobic exercise a few times a week as well for 30 minutes at a time. Continue current regimen. If LDL is not improved consider daily dosing of atorvastatin. Recheck labs in 6 months.  - atorvastatin 10 MG Oral Tab; Take 1 tablet (10 mg total) by mouth 3 (three) times a week. MON, WED, FRI  Dispense: 36 tablet; Refill: 1    3. Generalized anxiety disorder  Patient presents for anxiety follow-up.  Did not tolerate buspirone and has since discontinued.  Will continue escitalopram 30 mg daily and hydroxyzine as needed.  Follow-up in 6 months or sooner if needed.  - escitalopram 20 MG Oral Tab; Take 1.5 tablets (30 mg total) by mouth daily.  Dispense: 135 tablet; Refill: 1  - hydrOXYzine 10 MG Oral Tab; TAKE 1-2 TABLETS BY MOUTH THREE TIMES A DAY AS NEEDED FOR ANXIETY  Dispense: 60 tablet; Refill: 1    4. Essential hypertension  Patient presents for blood pressure follow-up.  Doing well.  Will continue current regimen.  Labs due in 6 months.  - losartan 100 MG Oral Tab; Take 1 tablet (100 mg total) by mouth daily.  Dispense: 90 tablet; Refill: 1    5. Varicose veins of both legs with edema  Patient has history of varicose veins with swelling.  Will refer to vascular, appreciate evaluation recommendations.  - Vascular Surgery - In Network    6. Sinus congestion  Patient does well with  Flonase.  Will refill.  - fluticasone propionate 50 MCG/ACT Nasal Suspension; 2 sprays by Each Nare route daily.  Dispense: 15.8 mL; Refill: 3    7. Need for vaccination  Due for Tdap.  - discussed shingles vaccine as well to do in the future  - TdaP (Boostrix) Vaccine (> 7 Y)        Meds & Refills for this Visit:  Requested Prescriptions     Signed Prescriptions Disp Refills    atorvastatin 10 MG Oral Tab 36 tablet 1     Sig: Take 1 tablet (10 mg total) by mouth 3 (three) times a week. MON, WED, FRI    escitalopram 20 MG Oral Tab 135 tablet 1     Sig: Take 1.5 tablets (30 mg total) by mouth daily.    fluticasone propionate 50 MCG/ACT Nasal Suspension 15.8 mL 3     Si sprays by Each Nare route daily.    hydrOXYzine 10 MG Oral Tab 60 tablet 1     Sig: TAKE 1-2 TABLETS BY MOUTH THREE TIMES A DAY AS NEEDED FOR ANXIETY    losartan 100 MG Oral Tab 90 tablet 1     Sig: Take 1 tablet (100 mg total) by mouth daily.       Stop Taking                busPIRone 5 MG Oral Tab    Take 1 tablet (5 mg total) by mouth in the morning and 1 tablet (5 mg total) before bedtime.            Health Maintenance:  Health Maintenance Due   Topic Date Due    DTaP,Tdap,and Td Vaccines (1 - Tdap) Never done    Zoster Vaccines (1 of 2) Never done    COVID-19 Vaccine ( - -24 season) 2023    Influenza Vaccine (1) 10/01/2023    Annual Depression Screening  2024    Annual Physical  2024       Patient/Caregiver Education: There are no barriers to learning. Medical education done.   Outcome: Patient verbalizes understanding. Patient is notified to call with any questions, complications, allergies, or worsening or changing symptoms.  Patient is to call with any side effects or complications from the treatments as a result of today.     Problem List:  Patient Active Problem List   Diagnosis    Anxiety    Hypercholesterolemia    Prediabetes    Mixed hyperlipidemia    Essential hypertension    Type 2 diabetes mellitus with  hyperglycemia, without long-term current use of insulin (HCC)    Special screening for malignant neoplasm of colon    Inflamed penis    Diet-controlled diabetes mellitus (HCC)    Venous insufficiency of both lower extremities    Varicose veins of both legs with edema       Return in about 6 months (around 9/7/2024) for 1. annual physical, 2. medication follow up.    Anali Akins MD  [facility]  03/07/24      Please note that portions of this note may have been completed with a voice recognition program. Efforts were made to edit the dictations but occasionally words are mis-transcribed. Thank you for your understanding.

## 2024-07-09 ENCOUNTER — LAB ENCOUNTER (OUTPATIENT)
Dept: LAB | Age: 54
End: 2024-07-09
Attending: STUDENT IN AN ORGANIZED HEALTH CARE EDUCATION/TRAINING PROGRAM
Payer: COMMERCIAL

## 2024-09-02 DIAGNOSIS — E78.2 MIXED HYPERLIPIDEMIA: ICD-10-CM

## 2024-09-02 DIAGNOSIS — I10 ESSENTIAL HYPERTENSION: ICD-10-CM

## 2024-09-04 RX ORDER — LOSARTAN POTASSIUM 100 MG/1
100 TABLET ORAL DAILY
Qty: 90 TABLET | Refills: 1 | Status: SHIPPED | OUTPATIENT
Start: 2024-09-04

## 2024-09-04 RX ORDER — ATORVASTATIN CALCIUM 10 MG/1
TABLET, FILM COATED ORAL
Qty: 36 TABLET | Refills: 1 | Status: SHIPPED | OUTPATIENT
Start: 2024-09-04

## 2024-09-04 NOTE — TELEPHONE ENCOUNTER
Please call patient to schedule a physical/med check, then route to clinical staff. Thank you!      Last Office Visit: 3/7/24  Last Refill: 3/7/24-and 3/8/24   Return to Clinic: 6 months   Protocol: passed  NOV: n/a    Requested Prescriptions     Pending Prescriptions Disp Refills    LOSARTAN 100 MG Oral Tab [Pharmacy Med Name: LOSARTAN POTASSIUM 100 MG TAB] 90 tablet 1     Sig: TAKE 1 TABLET BY MOUTH DAILY    ATORVASTATIN 10 MG Oral Tab [Pharmacy Med Name: ATORVASTATIN 10 MG TABLET] 36 tablet 1     Sig: TAKE ONE TABLET BY MOUTH THREE TIMES A WEEK. MON, WED, FRI.       Please approve if appropriate.     Thank you!

## 2024-10-08 ENCOUNTER — OFFICE VISIT (OUTPATIENT)
Facility: CLINIC | Age: 54
End: 2024-10-08

## 2024-10-08 VITALS — BODY MASS INDEX: 28.62 KG/M2 | RESPIRATION RATE: 20 BRPM | WEIGHT: 191 LBS | HEIGHT: 68.31 IN

## 2024-10-08 DIAGNOSIS — I83.813 VARICOSE VEINS OF BILATERAL LOWER EXTREMITIES WITH PAIN: Primary | ICD-10-CM

## 2024-10-08 NOTE — PROGRESS NOTES
VASCULAR SURGERY CONSULT NOTE      Solomon Sevilla   :  1970  MR#  [id number]    REFERRING PHYSICIAN:  No ref. provider found  PRIMARY CARE PHYSICIAN:  Anali Akins MD    Chief Complaint   Patient presents with    Consult    Varicose Veins     Patient is c/o varicose veins itching and mild pain on bilateral legs x 10 years  No compressions use  PRE-DM  No blockages Hx  BLE reflux on 24       HPI:    The patient is a 54 year old male who has been referred to the clinic today for an evaluation of his bilateral lower extremity heaviness, tiredness, itchiness, edema and pain after prolonged standing. The pain is reported in his distal thighs, calves, and distal legs. This is interfering with his activities of daily living and exercise. He has not worn compression stockings in the recent past. He had a venous ultrasound ordered by his PCP Dr. Akins over a year ago which revealed significant bilateral reflux of the superficial and deep veins.     PAST MEDICAL HISTORY:     Past Medical History:    Cryptic tonsil    Diabetes mellitus (HCC)    Essential hypertension    Gilbert's disease    in question    Hemorrhoids    High cholesterol    Impaired fasting glucose    Other abnormal glucose    Other and unspecified hyperlipidemia    and mixed hyperlipidemia    Panic disorder without agoraphobia    Pure hypercholesterolemia    Sciatica    Sleep disturbance       PAST SURGICAL HISTORY:     Past Surgical History:   Procedure Laterality Date    Appendectomy      peritonitis    Appendectomy          MEDICATIONS:     Current Outpatient Medications   Medication Sig Dispense Refill    LOSARTAN 100 MG Oral Tab TAKE 1 TABLET BY MOUTH DAILY 90 tablet 1    ATORVASTATIN 10 MG Oral Tab TAKE ONE TABLET BY MOUTH THREE TIMES A WEEK. MON, WED, FRI. 36 tablet 1    escitalopram 20 MG Oral Tab Take 1.5 tablets (30 mg total) by mouth daily. 135 tablet 1    fluticasone propionate 50 MCG/ACT Nasal Suspension 2 sprays by Each  Nare route daily. 15.8 mL 3    hydrOXYzine 10 MG Oral Tab TAKE 1-2 TABLETS BY MOUTH THREE TIMES A DAY AS NEEDED FOR ANXIETY 60 tablet 1    Glucose Blood (ONETOUCH VERIO) In Vitro Strip USE TO CHECK GLUCOSE DAILY 90 strip 1    ONETOUCH DELICA LANCETS FINE Does not apply Misc Test twice daily 2 Box 3       ALLERGIES:     Allergies   Allergen Reactions    Propranolol RASH and SWELLING    Pravastatin      Sodium; weakness         SOCIAL HISTORY:     Social History     Socioeconomic History    Marital status: Single    Number of children: 0   Occupational History    Occupation: on-site management   Tobacco Use    Smoking status: Former     Current packs/day: 0.00     Types: Cigarettes     Start date: 5/3/2006     Quit date: 5/3/2014     Years since quitting: 10.4     Passive exposure: Never    Smokeless tobacco: Never    Tobacco comments:     had been 1-3 cigarettes per week   Vaping Use    Vaping status: Never Used   Substance and Sexual Activity    Alcohol use: Yes     Alcohol/week: 3.0 standard drinks of alcohol     Types: 1 Glasses of wine, 1 Cans of beer, 1 Standard drinks or equivalent per week     Comment: socially ; any type    Drug use: No   Other Topics Concern    Caffeine Concern No     Comment: 3/4 of decaf. 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     Seat Belt Yes   Social History Narrative    Has a dog.        FAMILY HISTORY:     Family History   Problem Relation Age of Onset    Diabetes Paternal Uncle     Diabetes Paternal Uncle     Diabetes Paternal Uncle     Diabetes Paternal Uncle     Diabetes Maternal Aunt     Diabetes Maternal Aunt     Diabetes Other         aunts,uncles    High Cholesterol Mother     Lipids Mother     High Cholesterol Brother     Arthritis Brother     Other (Hypotension[other])  Father         diet & exercise; no contact since 2000       ROS:   A 12 point review of systems with pertinent positives and negatives listed in the HPI.    PHYSICAL EXAM:   Resp 20   Ht 5' 8.31\" (1.735 m)   Wt 191 lb (86.6 kg)   BMI 28.78 kg/m²   GENERAL: alert and orientated X 3, well developed, well nourished, in no apparent distress  HEENT: ears and throat are clear  NECK: supple, no lymphadenopathy, thyroid wnl  CAROTID: No bruits  RESPIRATORY: no rales, rhonchi, or wheezes B  CARDIO: RRR without murmur, no murmur, no gallop   ABDOMEN: soft, non-tender with no palpable aneurysm or masses  BACK: normal, no tenderness  SKIN: no rashes, warm and dry  NEURO/PSYCH: orientated x3, normal mood and affect, no sensory or motor deficit  EXTREMITIES: full range of motion, no tenderness or edema in either leg.   VASCULAR  Pulse exam right: femoral 2+, DP  2+, PT  2+  Pulse exam left: femoral  2+, DP  2+, PT  2+      Vein Assessment:      Moderately large bilateral  LE bulgy varicose veins with no significant hemosiderin deposition.    IMPRESSION:   Bilateral lower extremity pain due to venous insufficiency.   Symptomatic calf varicosities.    PLAN:     We reviewed the options for management for venous insufficiency, including conservative therapy, sclerotherapy, stab phlebectomy, and endovenous laser ablation. The patient was educated in the benign condition of venous disease.  I have given the patient a prescription for Grade II compression stockings (20-30 mmHg, thigh-highs). We reviewed the importance of wearing these daily and consistently. We also reviewed other types of conservative measures, such as periodic leg elevation, walking for exercise, analgesic use, attempts at weight loss, and the avoidance of prolonged standing.  I have sent the patient for a repeat venous reflux ultrasound. Should the ultrasound study reveal worsening venous reflux with dilation and he does not have relief of symptoms with  conservative therapy, then endovenous laser ablation and stab phlebectomy may be possible treatment options.  I explained to the patient that his insurance company may require a 6-12 week trial period of conservative therapy prior to authorizing endovenous ablation treatment.  The patient understood and agreed to proceed with this treatment plan, all of his questions were answered during this clinic visit.       Thank you for allowing to participate in the care of your patient.    DARRELL Ortiz  Division of Vascular Surgery with Dr. Najjar.

## 2024-10-08 NOTE — PATIENT INSTRUCTIONS
[employer] Vendor Locations:  Please call and make an appointment for your compression stockings.  The list below are different vendors who have compression stockings available.  Patients under HMO insurance should get a primary physician referral prior to getting fitted.   Some of the following fitted clinics accept insurance or Self Pay:    [facility]         BCBS PPO                 BCBS HMO                 [employer] PPO  [address], [place]  Phone # [phone]  Fax # [phone]    [facility]  Insurance Plans  $85-$150  Payment Plans purchase over $200  [address] [facility] [address] [place] [place]   [address]  [place], [place]  Phone # [phone]  Fax # [phone]    [facility]  $[phone]  [address], [place]  [phone]    [facility]  $40-$120                 [address] [place], [place]  [phone] Ext # [phone]    [facility]  $50-$100  [address]  [place], [place]  [phone]    [facility]  [address], [place]  [phone] (MyEdu) Medicare Insurance accepted

## 2024-10-23 ENCOUNTER — NURSE ONLY (OUTPATIENT)
Facility: CLINIC | Age: 54
End: 2024-10-23
Payer: COMMERCIAL

## 2024-10-23 DIAGNOSIS — I83.813 VARICOSE VEINS OF BILATERAL LOWER EXTREMITIES WITH PAIN: ICD-10-CM

## 2024-10-26 ENCOUNTER — LAB ENCOUNTER (OUTPATIENT)
Dept: LAB | Age: 54
End: 2024-10-26
Attending: STUDENT IN AN ORGANIZED HEALTH CARE EDUCATION/TRAINING PROGRAM
Payer: COMMERCIAL

## 2024-10-26 DIAGNOSIS — E11.9 DIET-CONTROLLED DIABETES MELLITUS (HCC): ICD-10-CM

## 2024-10-26 LAB
ALBUMIN SERPL-MCNC: 4 G/DL (ref 3.2–4.8)
ALBUMIN/GLOB SERPL: 1.3 {RATIO} (ref 1–2)
ALP LIVER SERPL-CCNC: 54 U/L
ALT SERPL-CCNC: 32 U/L
ANION GAP SERPL CALC-SCNC: 4 MMOL/L (ref 0–18)
AST SERPL-CCNC: 33 U/L (ref ?–34)
BILIRUB SERPL-MCNC: 1.5 MG/DL (ref 0.3–1.2)
BUN BLD-MCNC: 14 MG/DL (ref 9–23)
CALCIUM BLD-MCNC: 9.6 MG/DL (ref 8.7–10.4)
CHLORIDE SERPL-SCNC: 106 MMOL/L (ref 98–112)
CHOLEST SERPL-MCNC: 139 MG/DL (ref ?–200)
CO2 SERPL-SCNC: 26 MMOL/L (ref 21–32)
CREAT BLD-MCNC: 0.96 MG/DL
CREAT UR-SCNC: 125.8 MG/DL
EGFRCR SERPLBLD CKD-EPI 2021: 93 ML/MIN/1.73M2 (ref 60–?)
EST. AVERAGE GLUCOSE BLD GHB EST-MCNC: 143 MG/DL (ref 68–126)
FASTING PATIENT LIPID ANSWER: YES
FASTING STATUS PATIENT QL REPORTED: YES
GLOBULIN PLAS-MCNC: 3.2 G/DL (ref 2–3.5)
GLUCOSE BLD-MCNC: 130 MG/DL (ref 70–99)
HBA1C MFR BLD: 6.6 % (ref ?–5.7)
HDLC SERPL-MCNC: 43 MG/DL (ref 40–59)
LDLC SERPL CALC-MCNC: 75 MG/DL (ref ?–100)
MICROALBUMIN UR-MCNC: <0.3 MG/DL
NONHDLC SERPL-MCNC: 96 MG/DL (ref ?–130)
OSMOLALITY SERPL CALC.SUM OF ELEC: 284 MOSM/KG (ref 275–295)
POTASSIUM SERPL-SCNC: 4.6 MMOL/L (ref 3.5–5.1)
PROT SERPL-MCNC: 7.2 G/DL (ref 5.7–8.2)
SODIUM SERPL-SCNC: 136 MMOL/L (ref 136–145)
TRIGL SERPL-MCNC: 117 MG/DL (ref 30–149)
VLDLC SERPL CALC-MCNC: 18 MG/DL (ref 0–30)

## 2024-10-26 PROCEDURE — 36415 COLL VENOUS BLD VENIPUNCTURE: CPT

## 2024-10-26 PROCEDURE — 80061 LIPID PANEL: CPT

## 2024-10-26 PROCEDURE — 82043 UR ALBUMIN QUANTITATIVE: CPT

## 2024-10-26 PROCEDURE — 82570 ASSAY OF URINE CREATININE: CPT

## 2024-10-26 PROCEDURE — 80053 COMPREHEN METABOLIC PANEL: CPT

## 2024-10-26 PROCEDURE — 83036 HEMOGLOBIN GLYCOSYLATED A1C: CPT

## 2024-10-30 ENCOUNTER — OFFICE VISIT (OUTPATIENT)
Dept: FAMILY MEDICINE CLINIC | Facility: CLINIC | Age: 54
End: 2024-10-30
Payer: COMMERCIAL

## 2024-10-30 VITALS
WEIGHT: 197.19 LBS | SYSTOLIC BLOOD PRESSURE: 124 MMHG | HEIGHT: 68.31 IN | DIASTOLIC BLOOD PRESSURE: 78 MMHG | RESPIRATION RATE: 16 BRPM | TEMPERATURE: 97 F | BODY MASS INDEX: 29.54 KG/M2 | HEART RATE: 72 BPM

## 2024-10-30 DIAGNOSIS — I10 ESSENTIAL HYPERTENSION: ICD-10-CM

## 2024-10-30 DIAGNOSIS — Z23 NEED FOR VACCINATION: ICD-10-CM

## 2024-10-30 DIAGNOSIS — R68.82 DECREASED LIBIDO: ICD-10-CM

## 2024-10-30 DIAGNOSIS — Z11.3 SCREENING FOR STD (SEXUALLY TRANSMITTED DISEASE): ICD-10-CM

## 2024-10-30 DIAGNOSIS — E11.9 DIET-CONTROLLED DIABETES MELLITUS (HCC): ICD-10-CM

## 2024-10-30 DIAGNOSIS — F41.9 ANXIETY: ICD-10-CM

## 2024-10-30 DIAGNOSIS — Z00.00 WELLNESS EXAMINATION: Primary | ICD-10-CM

## 2024-10-30 DIAGNOSIS — Z12.5 SCREENING FOR PROSTATE CANCER: ICD-10-CM

## 2024-10-30 DIAGNOSIS — E78.2 MIXED HYPERLIPIDEMIA: ICD-10-CM

## 2024-10-30 DIAGNOSIS — F41.1 GENERALIZED ANXIETY DISORDER: ICD-10-CM

## 2024-10-30 PROCEDURE — 99214 OFFICE O/P EST MOD 30 MIN: CPT | Performed by: STUDENT IN AN ORGANIZED HEALTH CARE EDUCATION/TRAINING PROGRAM

## 2024-10-30 PROCEDURE — 90471 IMMUNIZATION ADMIN: CPT | Performed by: STUDENT IN AN ORGANIZED HEALTH CARE EDUCATION/TRAINING PROGRAM

## 2024-10-30 PROCEDURE — 90656 IIV3 VACC NO PRSV 0.5 ML IM: CPT | Performed by: STUDENT IN AN ORGANIZED HEALTH CARE EDUCATION/TRAINING PROGRAM

## 2024-10-30 PROCEDURE — 99396 PREV VISIT EST AGE 40-64: CPT | Performed by: STUDENT IN AN ORGANIZED HEALTH CARE EDUCATION/TRAINING PROGRAM

## 2024-10-30 RX ORDER — ESCITALOPRAM OXALATE 5 MG/1
TABLET ORAL
Qty: 11 TABLET | Refills: 0 | Status: SHIPPED | OUTPATIENT
Start: 2024-10-30 | End: 2024-11-27

## 2024-10-30 NOTE — PATIENT INSTRUCTIONS
Take lexapro 5mg every other day for two weeks, then try to stop.    Refill policies:      Allow 3 business days for refills; controlled substances may take longer.  Contact your pharmacy at least 5-7 business days prior to running out of medication and have them send an electronic request or submit through the \"request refill\" option thru your [employer] account. No need to do both, as multiple requests will create an automated V[employer] message to notify of a denial for one of the duplicated requests, causing you undue confusion.   Refills are NOT addressed on weekends; covering physicians do not authorize routine medications on weekends.  No narcotics or controlled substances are refilled after noon on Fridays or by on call physicians.  By law, narcotics cannot be faxed or phoned into your pharmacy.  If your prescription is due for a refill, you may be due for a follow up appointment. Please call our office at [phone] to make an appointment or schedule an appointment via Vitt[employer].  To best provide you care, patients receiving routine medications need to be seen at least twice a year. Patients receiving narcotic/controlled substance medications need to be seen at least once every 3 months.  In the event that your preferred pharmacy does not have the requested medication in stock (ie Backordered), it is your responsibility to find another pharmacy that has the requested medication available. We will gladly send a new prescription to that pharmacy at your request.  controlled substances may not be able to be filled out of state due to license restrictions.  If you have a planned trip, it's best to call your pharmacy at least 5-7 business days to prevent any delays in your medication refill.    Scheduling Tests:    If your physician has ordered radiology tests such as MRI or CT scans, please contact Central Scheduling at [phone] right away to schedule the test.  Once scheduled, the [facility] Centralized Referral Team  will work with your insurance carrier to obtain pre-certification or prior authorization.  Depending on your insurance carrier, approval may take 3-10 days.  It is highly recommended patients assure they have received an authorization before having a test performed.  If test is done without insurance authorization, patient may be responsible for the entire amount billed.      Precertification and Prior Authorizations:  If your physician has recommended that you have a procedure or additional testing performed the [facility] Centralized Referral Team will contact your insurance carrier to obtain pre-certification or prior authorization.    You are strongly encouraged to contact your insurance carrier to verify that your procedure/test has been approved and is a COVERED benefit.  Although the [facility] Centralized Referral Team does its due diligence, the insurance carrier gives the disclaimer that \"Although the procedure is authorized, this does not guarantee payment.\"    Ultimately the patient is responsible for payment.   Thank you for your understanding in this matter.  Paperwork Completion:  If you require FMLA or disability paperwork for your recovery, please make sure to either drop it off or have it faxed to our office at [phone]. Be sure the form has your name and date of birth on it.  The form will be faxed to our Forms Department and they will complete it for you.  There is a 25$ fee for all forms that need to be filled out.  Please be aware there is a 10-14 day turnaround time.  You will need to sign a release of information (STERLING) form if your paperwork does not come with one.  You may call the Forms Department with any questions at [phone].  Their fax number is [phone].

## 2024-10-30 NOTE — PROGRESS NOTES
[facility]  10/30/24    Chief Complaint   Patient presents with    Physical    Medication Follow-Up     HPI:   Solomon Sevilla is a 54 year old male who presents for a complete physical exam.    Med/Surg/Allergy/Fam hx updates: see below  Home: now in new relationship, she is younger than him  Work: going well working in [employer]  Diet: see below  Exercise: cut back on exercise - will be doing more exercise again  Sleep: good  Mood: see below  Sexual activity: noticed less sex drive also having some erectile dysfunction at times; trying to conceive. She was on injection.  Immunizations: flu shot  Screenings: did colonoscopy    Patient presents for diabetes follow up. Not currently on Diabetic meds. Will be watching what he is eating now. Meds : Kept the same  Lab Results   Component Value Date    A1C 6.6 (H) 10/26/2024    A1C 6.1 (H) 12/08/2023    A1C 6.3 (H) 04/21/2023    A1C 6.2 (H) 11/01/2022    A1C 6.8 (H) 04/21/2022      Last Diabetic Eye Exam: due to schedule  No data recorded  No data recorded    Patient presents for recheck of his hypertension. Pt has been taking medications as instructed, no medication side effect.  BP Meds: losartan Tabs - 100 MG     Last Cr was 0.96 done on 10/26/2024.Last eGFR was 93 on 10/26/2024.    Patient presents for follow up of hyperlipidemia. Patient has been taking medications as prescribed, no muscle aches noted. Working on diet and exercise.   Cholesterol Meds: atorvastatin Tabs - 10 MG   Cholesterol: 139, done on 10/26/2024.  HDL Cholesterol: 43, done on 10/26/2024.  LDL Cholesterol: 75, done on 10/26/2024.  TriGlycerides 117, done on 10/26/2024.    Patient is taking escitalopram 5mg daily. Doing well. Was quartering pills.     Wt Readings from Last 6 Encounters:   10/30/24 197 lb 3.2 oz (89.4 kg)   10/08/24 191 lb (86.6 kg)   03/07/24 191 lb 3.2 oz (86.7 kg)   09/01/23 192 lb (87.1 kg)   01/05/23 188 lb (85.3 kg)   11/11/22 184 lb (83.5 kg)     Body  mass index is 29.71 kg/m².     Results for orders placed or performed in visit on 10/26/24   Microalb/Creat Ratio, Random Urine in 6 months    Collection Time: 10/26/24  9:31 AM   Result Value Ref Range    Microalbumin, Urine <0.30 mg/dL    Creatinine Ur Random 125.80 mg/dL    Malb/Cre Calc     CMP in 6 months    Collection Time: 10/26/24  9:31 AM   Result Value Ref Range    Glucose 130 (H) 70 - 99 mg/dL    Sodium 136 136 - 145 mmol/L    Potassium 4.6 3.5 - 5.1 mmol/L    Chloride 106 98 - 112 mmol/L    CO2 26.0 21.0 - 32.0 mmol/L    Anion Gap 4 0 - 18 mmol/L    BUN 14 9 - 23 mg/dL    Creatinine 0.96 0.70 - 1.30 mg/dL    Calcium, Total 9.6 8.7 - 10.4 mg/dL    Calculated Osmolality 284 275 - 295 mOsm/kg    eGFR-Cr 93 >=60 mL/min/1.73m2    AST 33 <34 U/L    ALT 32 10 - 49 U/L    Alkaline Phosphatase 54 45 - 117 U/L    Bilirubin, Total 1.5 (H) 0.3 - 1.2 mg/dL    Total Protein 7.2 5.7 - 8.2 g/dL    Albumin 4.0 3.2 - 4.8 g/dL    Globulin  3.2 2.0 - 3.5 g/dL    A/G Ratio 1.3 1.0 - 2.0    Patient Fasting for CMP? Yes    Lipid in 6 months    Collection Time: 10/26/24  9:31 AM   Result Value Ref Range    Cholesterol, Total 139 <200 mg/dL    HDL Cholesterol 43 40 - 59 mg/dL    Triglycerides 117 30 - 149 mg/dL    LDL Cholesterol 75 <100 mg/dL    VLDL 18 0 - 30 mg/dL    Non HDL Chol 96 <130 mg/dL    Patient Fasting for Lipid? Yes    Hemoglobin A1C in 6 months    Collection Time: 10/26/24  9:31 AM   Result Value Ref Range    HgbA1C 6.6 (H) <5.7 %    Estimated Average Glucose 143 (H) 68 - 126 mg/dL         Current Outpatient Medications   Medication Sig Dispense Refill    escitalopram 5 MG Oral Tab Take 0.5 tablets (2.5 mg total) by mouth daily for 14 days, THEN 0.5 tablets (2.5 mg total) every other day for 14 days. 11 tablet 0    LOSARTAN 100 MG Oral Tab TAKE 1 TABLET BY MOUTH DAILY 90 tablet 1    ATORVASTATIN 10 MG Oral Tab TAKE ONE TABLET BY MOUTH THREE TIMES A WEEK. MON, WED, FRI. 36 tablet 1    fluticasone propionate 50  MCG/ACT Nasal Suspension 2 sprays by Each Nare route daily. 15.8 mL 3    hydrOXYzine 10 MG Oral Tab TAKE 1-2 TABLETS BY MOUTH THREE TIMES A DAY AS NEEDED FOR ANXIETY 60 tablet 1    Glucose Blood (ONETOUCH VERIO) In Vitro Strip USE TO CHECK GLUCOSE DAILY 90 strip 1    ONETOUCH DELICA LANCETS FINE Does not apply Misc Test twice daily 2 Box 3      Allergies[1]   Past Medical History:    Cryptic tonsil    Diabetes mellitus (HCC)    Essential hypertension    Gilbert's disease    in question    Hemorrhoids    High cholesterol    Impaired fasting glucose    Other abnormal glucose    Other and unspecified hyperlipidemia    and mixed hyperlipidemia    Panic disorder without agoraphobia    Pure hypercholesterolemia    Sciatica    Sleep disturbance      Past Surgical History:   Procedure Laterality Date    Appendectomy      peritonitis    Appendectomy        Family History   Problem Relation Age of Onset    Diabetes Paternal Uncle     Diabetes Paternal Uncle     Diabetes Paternal Uncle     Diabetes Paternal Uncle     Diabetes Maternal Aunt     Diabetes Maternal Aunt     Diabetes Other         aunts,uncles    High Cholesterol Mother     Lipids Mother     High Cholesterol Brother     Arthritis Brother     Other (Hypotension[other]) Father         diet & exercise; no contact since 2000      Social History:  Social History     Socioeconomic History    Marital status: Single    Number of children: 0   Occupational History    Occupation: on-site management   Tobacco Use    Smoking status: Former     Current packs/day: 0.00     Types: Cigarettes     Start date: 5/3/2006     Quit date: 5/3/2014     Years since quitting: 10.5     Passive exposure: Never    Smokeless tobacco: Never    Tobacco comments:     had been 1-3 cigarettes per week   Vaping Use    Vaping status: Never Used   Substance and Sexual Activity    Alcohol use: Yes     Alcohol/week: 3.0 standard drinks of alcohol     Types: 1 Glasses of wine, 1 Cans of beer, 1 Standard  drinks or equivalent per week     Comment: socially ; any type    Drug use: No   Other Topics Concern    Caffeine Concern No     Comment: 3/4 of decaf. coffee every other day    Occupational Exposure No    Sleep Concern No    Stress Concern Yes     Comment: work related    Special Diet No     Comment: watching carbs/sugars    Exercise Yes     Comment: not lately    Seat Belt Yes   Social History Narrative    Has a dog.         REVIEW OF SYSTEMS:   GENERAL: feels well otherwise  SKIN: denies any unusual skin lesions  EYES:denies blurred vision or double vision  HEENT: denies nasal congestion, sinus pain or ST  LUNGS: denies shortness of breath with exertion, denies cough  CARDIOVASCULAR: denies chest pain on exertion or at rest, denies palpitations  GI: denies abdominal pain,denies heartburn, denies n/v/d/c/blood in stool  : denies nocturia or changes in stream  MUSCULOSKELETAL: denies back pain  NEURO: denies headaches, denies LH/dizziness/syncope  PSYCHE: denies depression or anxiety  HEMATOLOGIC: denies hx of anemia  ENDOCRINE: denies thyroid history  ALL/ASTHMA: denies hx of allergy or asthma    EXAM:   /78   Pulse 72   Temp 97.1 °F (36.2 °C) (Temporal)   Resp 16   Ht 5' 8.31\" (1.735 m)   Wt 197 lb 3.2 oz (89.4 kg)   BMI 29.71 kg/m²   Body mass index is 29.71 kg/m².   GENERAL: well developed, well nourished,in no apparent distress  SKIN: no rashes,no suspicious lesions  HEENT: atraumatic, normocephalic,ears and throat are clear  EYES:PERRLA, EOMI, conjunctiva are clear  NECK: supple,no adenopathy,no bruits, no thyromegaly  LUNGS: clear to auscultation, no r/r/w  CARDIO: RRR without murmur  GI: good BS's,no masses, HSM or tenderness  :  two descended testes, no masses, no hernia, no penile lesions  RECTAL: good rectal tone, prostate shows no masses, stool is OB negative  MUSCULOSKELETAL: back is not tender, FROM of the back  EXTREMITIES: no cyanosis, clubbing or edema  NEURO: Oriented times  three,cranial nerves are intact,motor and sensory are grossly intact; 2+ knee reflexes bilaterally  VASCULAR: 2+ posterior tibialis pulses bilaterally    ASSESSMENT AND PLAN:   Solomon Sevilla is a 54 year old male who presents for a complete physical exam.     1. Wellness examination  - BP: at goal   - BMI: Body mass index is 29.71 kg/m²., c/w overweight BMI, recommended low fat diet and aerobic exercise 30 minutes three times weekly.   - Last colonoscopy: 03/07/2019.     2. Diet-controlled diabetes mellitus (HCC)  Patient presents for diabetes follow up  - needs some improvement  - low carb diet recommended  - start exercising again  - repeat labs and follow up in 6mo or sooner if needed  - CMP in 6 months; Future  - Lipid in 6 months; Future  - Hemoglobin A1C in 6 months; Future  - Microalb/Creat Ratio, Random Urine in 6 months; Future    3. Mixed hyperlipidemia  Patient presents for follow up of HLD  - no side effects of medications  - will continue current regimen  - low fat diet and exercise  - follow up in 6mo or sooner if needed    4. Essential hypertension  Pt presents for follow up of HTN  - no red flag symptoms  - BP controlled  - continue current regimen  - RTC 6mo or sooner if needed    5. Anxiety  Patient presents for follow up. Improving. Will wean off lexapro. Monitor response.    6. Screening for prostate cancer  - PSA, Total (Screening) [E]; Future    7. Decreased libido  Patient has noticed decreased libido and sometimes ED  - will check testosterone level  - discussed that treating low T will decrease sperm count and patient is in the process of TTC with partner  - Testosterone, Total Free, Male [E]; Future    8. Screening for STD (sexually transmitted disease)  As part of workup in case fertility workup is started   - T Pallidum Screening [place]; Future  - HIV AG AB Combo (Consent Obtained prechecked); Future  - Urine Chlamydia/GC Amplification; Future  - Hepatitis B Surface Antibody; Future  -  Hepatitis B Surface Antigen; Future  - HCV Antibody; Future    9. Need for vaccination  Flu shot today  - Fluzone trivalent vaccine, PF 0.5mL, 6mo+ (66472)    10. Generalized anxiety disorder  See above  - escitalopram 5 MG Oral Tab; Take 0.5 tablets (2.5 mg total) by mouth daily for 14 days, THEN 0.5 tablets (2.5 mg total) every other day for 14 days.  Dispense: 11 tablet; Refill: 0        The patient indicates understanding of these issues and agrees to the plan.    Health maintenance, will check:   Orders Placed This Encounter   Procedures    PSA, Total (Screening) [E]    CMP in 6 months    Lipid in 6 months    Hemoglobin A1C in 6 months    Microalb/Creat Ratio, Random Urine in 6 months    Testosterone, Total Free, Male [E]    T Pallidum Screening [place]    HIV AG AB Combo (Consent Obtained prechecked)    Hepatitis B Surface Antibody    Hepatitis B Surface Antigen    HCV Antibody    Fluzone trivalent vaccine, PF 0.5mL, 6mo+ (02470)    Urine Chlamydia/GC Amplification           Encounter Diagnoses   Name Primary?    Wellness examination Yes    Diet-controlled diabetes mellitus (HCC)     Mixed hyperlipidemia     Essential hypertension     Anxiety     Screening for prostate cancer     Decreased libido     Screening for STD (sexually transmitted disease)     Need for vaccination     Generalized anxiety disorder        Meds & Refills for this Visit:  Requested Prescriptions     Signed Prescriptions Disp Refills    escitalopram 5 MG Oral Tab 11 tablet 0     Sig: Take 0.5 tablets (2.5 mg total) by mouth daily for 14 days, THEN 0.5 tablets (2.5 mg total) every other day for 14 days.       Imaging & Consults:  INFLUENZA VACCINE, TRI, PRESERV FREE, 0.5 ML      Return in about 6 months (around 4/30/2025) for medication follow up, or sooner if needed.      Anali Akins MD  [facility]  10/30/24         [1]   Allergies  Allergen Reactions    Propranolol RASH and SWELLING    Pravastatin       Sodium; weakness

## 2024-11-15 NOTE — TELEPHONE ENCOUNTER
"Pt requesting refill of ESCITALOPRAM 20 MG Oral Tab to [address] [facility] [id number] - [place] - [address] [phone], [phone]. Please note this is different [facility] than last filled. Pt has 3 days left of medication. States pharmacy has been attempting to request refills for several days w/ no response. Please send rx if appropriate, thank you!

## 2024-11-26 ENCOUNTER — LAB ENCOUNTER (OUTPATIENT)
Dept: LAB | Age: 54
End: 2024-11-26
Attending: STUDENT IN AN ORGANIZED HEALTH CARE EDUCATION/TRAINING PROGRAM
Payer: COMMERCIAL

## 2024-11-26 DIAGNOSIS — Z11.3 SCREENING FOR STD (SEXUALLY TRANSMITTED DISEASE): ICD-10-CM

## 2024-11-26 DIAGNOSIS — R68.82 DECREASED LIBIDO: ICD-10-CM

## 2024-11-26 LAB
HBV SURFACE AB SER QL: REACTIVE
HBV SURFACE AB SERPL IA-ACNC: 144.18 MIU/ML
HBV SURFACE AG SER-ACNC: <0.1 [IU]/L
HBV SURFACE AG SERPL QL IA: NONREACTIVE
HCV AB SERPL QL IA: NONREACTIVE
T PALLIDUM AB SER QL IA: NONREACTIVE

## 2024-11-26 PROCEDURE — 87591 N.GONORRHOEAE DNA AMP PROB: CPT

## 2024-11-26 PROCEDURE — 84402 ASSAY OF FREE TESTOSTERONE: CPT

## 2024-11-26 PROCEDURE — 87491 CHLMYD TRACH DNA AMP PROBE: CPT

## 2024-11-26 PROCEDURE — 86803 HEPATITIS C AB TEST: CPT

## 2024-11-26 PROCEDURE — 87389 HIV-1 AG W/HIV-1&-2 AB AG IA: CPT

## 2024-11-26 PROCEDURE — 87340 HEPATITIS B SURFACE AG IA: CPT

## 2024-11-26 PROCEDURE — 36415 COLL VENOUS BLD VENIPUNCTURE: CPT

## 2024-11-26 PROCEDURE — 84403 ASSAY OF TOTAL TESTOSTERONE: CPT

## 2024-11-26 PROCEDURE — 87801 DETECT AGNT MULT DNA AMPLI: CPT

## 2024-11-26 PROCEDURE — 86706 HEP B SURFACE ANTIBODY: CPT

## 2024-11-26 PROCEDURE — 86780 TREPONEMA PALLIDUM: CPT

## 2024-11-27 LAB
C TRACH DNA SPEC QL NAA+PROBE: NEGATIVE
N GONORRHOEA DNA SPEC QL NAA+PROBE: NEGATIVE

## 2024-11-30 LAB
FREE TESTOST DIRECT: 8.3 PG/ML
TESTOSTERONE: 345 NG/DL

## 2025-03-09 DIAGNOSIS — I10 ESSENTIAL HYPERTENSION: ICD-10-CM

## 2025-03-09 DIAGNOSIS — E78.2 MIXED HYPERLIPIDEMIA: ICD-10-CM

## 2025-03-11 RX ORDER — LOSARTAN POTASSIUM 100 MG/1
100 TABLET ORAL DAILY
Qty: 90 TABLET | Refills: 0 | Status: SHIPPED | OUTPATIENT
Start: 2025-03-11

## 2025-03-11 NOTE — TELEPHONE ENCOUNTER
Last Office Visit: 10/30/2024    Last Refill:   Requested Prescriptions     Pending Prescriptions Disp Refills    losartan 100 MG Oral Tab [Pharmacy Med Name: LOSARTAN POTASSIUM 100 MG TAB] 90 tablet 0     Sig: TAKE 1 TABLET BY MOUTH DAILY    atorvastatin 10 MG Oral Tab [Pharmacy Med Name: ATORVASTATIN 10 MG TABLET] 36 tablet 0     Sig: TAKE ONE TABLET BY MOUTH THREE TIMES A WEEK MON WEDNESDAY AND FRIDAY       Return to Clinic: 04/30/2025    Protocol: Atorvastatin failed

## 2025-03-12 RX ORDER — ATORVASTATIN CALCIUM 10 MG/1
10 TABLET, FILM COATED ORAL
Qty: 36 TABLET | Refills: 1 | Status: SHIPPED | OUTPATIENT
Start: 2025-03-12

## 2025-04-15 ENCOUNTER — LAB ENCOUNTER (OUTPATIENT)
Dept: LAB | Age: 55
End: 2025-04-15
Attending: STUDENT IN AN ORGANIZED HEALTH CARE EDUCATION/TRAINING PROGRAM
Payer: COMMERCIAL

## 2025-04-15 DIAGNOSIS — Z12.5 SCREENING FOR PROSTATE CANCER: ICD-10-CM

## 2025-04-15 DIAGNOSIS — E11.9 DIET-CONTROLLED DIABETES MELLITUS (HCC): ICD-10-CM

## 2025-04-15 LAB
ALBUMIN SERPL-MCNC: 4.4 G/DL (ref 3.2–4.8)
ALBUMIN/GLOB SERPL: 1.5 {RATIO} (ref 1–2)
ALP LIVER SERPL-CCNC: 49 U/L (ref 45–117)
ALT SERPL-CCNC: 24 U/L (ref 10–49)
ANION GAP SERPL CALC-SCNC: 9 MMOL/L (ref 0–18)
AST SERPL-CCNC: 20 U/L (ref ?–34)
BILIRUB SERPL-MCNC: 1.3 MG/DL (ref 0.3–1.2)
BUN BLD-MCNC: 19 MG/DL (ref 9–23)
CALCIUM BLD-MCNC: 9.6 MG/DL (ref 8.7–10.6)
CHLORIDE SERPL-SCNC: 105 MMOL/L (ref 98–112)
CHOLEST SERPL-MCNC: 130 MG/DL (ref ?–200)
CO2 SERPL-SCNC: 26 MMOL/L (ref 21–32)
COMPLEXED PSA SERPL-MCNC: 0.91 NG/ML (ref ?–4)
CREAT BLD-MCNC: 1.11 MG/DL (ref 0.7–1.3)
CREAT UR-SCNC: 81.1 MG/DL
EGFRCR SERPLBLD CKD-EPI 2021: 78 ML/MIN/1.73M2 (ref 60–?)
EST. AVERAGE GLUCOSE BLD GHB EST-MCNC: 148 MG/DL (ref 68–126)
FASTING PATIENT LIPID ANSWER: YES
FASTING STATUS PATIENT QL REPORTED: YES
GLOBULIN PLAS-MCNC: 3 G/DL (ref 2–3.5)
GLUCOSE BLD-MCNC: 125 MG/DL (ref 70–99)
HBA1C MFR BLD: 6.8 % (ref ?–5.7)
HDLC SERPL-MCNC: 42 MG/DL (ref 40–59)
LDLC SERPL CALC-MCNC: 68 MG/DL (ref ?–100)
MICROALBUMIN UR-MCNC: <0.3 MG/DL
NONHDLC SERPL-MCNC: 88 MG/DL (ref ?–130)
OSMOLALITY SERPL CALC.SUM OF ELEC: 294 MOSM/KG (ref 275–295)
POTASSIUM SERPL-SCNC: 4.8 MMOL/L (ref 3.5–5.1)
PROT SERPL-MCNC: 7.4 G/DL (ref 5.7–8.2)
SODIUM SERPL-SCNC: 140 MMOL/L (ref 136–145)
TRIGL SERPL-MCNC: 110 MG/DL (ref 30–149)
VLDLC SERPL CALC-MCNC: 17 MG/DL (ref 0–30)

## 2025-04-15 PROCEDURE — 82570 ASSAY OF URINE CREATININE: CPT | Performed by: STUDENT IN AN ORGANIZED HEALTH CARE EDUCATION/TRAINING PROGRAM

## 2025-04-15 PROCEDURE — 80061 LIPID PANEL: CPT | Performed by: STUDENT IN AN ORGANIZED HEALTH CARE EDUCATION/TRAINING PROGRAM

## 2025-04-15 PROCEDURE — 82043 UR ALBUMIN QUANTITATIVE: CPT | Performed by: STUDENT IN AN ORGANIZED HEALTH CARE EDUCATION/TRAINING PROGRAM

## 2025-04-15 PROCEDURE — 80053 COMPREHEN METABOLIC PANEL: CPT | Performed by: STUDENT IN AN ORGANIZED HEALTH CARE EDUCATION/TRAINING PROGRAM

## 2025-04-15 PROCEDURE — 83036 HEMOGLOBIN GLYCOSYLATED A1C: CPT | Performed by: STUDENT IN AN ORGANIZED HEALTH CARE EDUCATION/TRAINING PROGRAM

## 2025-04-15 PROCEDURE — 84153 ASSAY OF PSA TOTAL: CPT | Performed by: STUDENT IN AN ORGANIZED HEALTH CARE EDUCATION/TRAINING PROGRAM

## 2025-06-12 DIAGNOSIS — I10 ESSENTIAL HYPERTENSION: ICD-10-CM

## 2025-06-12 DIAGNOSIS — F41.1 GENERALIZED ANXIETY DISORDER: ICD-10-CM

## 2025-06-12 RX ORDER — ESCITALOPRAM OXALATE 20 MG/1
30 TABLET ORAL DAILY
Qty: 135 TABLET | Refills: 1 | OUTPATIENT
Start: 2025-06-12

## 2025-06-12 NOTE — TELEPHONE ENCOUNTER
[facility] sent reminding pt to schedule a physical.     Last Office Visit: 10/30/24  Last Refill: 3/11/25  Return to Clinic: 6 months   Protocol: passed   Failed- escitalopram, looks like refill is not appropriate.   NOV: n/a   Requested Prescriptions     Pending Prescriptions Disp Refills    LOSARTAN 100 MG Oral Tab [Pharmacy Med Name: LOSARTAN POTASSIUM 100 MG TAB] 30 tablet 0     Sig: TAKE 1 TABLET BY MOUTH DAILY     Refused Prescriptions Disp Refills    ESCITALOPRAM 20 MG Oral Tab [Pharmacy Med Name: ESCITALOPRAM 20 MG TABLET] 135 tablet 1     Sig: TAKE 1 AND 1/2 TABLET BY MOUTH DAILY     Refused By: KOBY DEMPSEY     Reason for Refusal: Refill not appropriate           Please approve if appropriate.     Thank you!

## 2025-06-13 RX ORDER — LOSARTAN POTASSIUM 100 MG/1
100 TABLET ORAL DAILY
Qty: 30 TABLET | Refills: 0 | Status: SHIPPED | OUTPATIENT
Start: 2025-06-13

## 2025-07-01 ENCOUNTER — OFFICE VISIT (OUTPATIENT)
Dept: FAMILY MEDICINE CLINIC | Facility: CLINIC | Age: 55
End: 2025-07-01
Payer: COMMERCIAL

## 2025-07-01 VITALS
BODY MASS INDEX: 29.21 KG/M2 | SYSTOLIC BLOOD PRESSURE: 116 MMHG | WEIGHT: 195 LBS | TEMPERATURE: 99 F | DIASTOLIC BLOOD PRESSURE: 70 MMHG | HEART RATE: 66 BPM | HEIGHT: 68.31 IN | RESPIRATION RATE: 16 BRPM

## 2025-07-01 DIAGNOSIS — E11.65 TYPE 2 DIABETES MELLITUS WITH HYPERGLYCEMIA, WITHOUT LONG-TERM CURRENT USE OF INSULIN (HCC): ICD-10-CM

## 2025-07-01 DIAGNOSIS — E78.2 MIXED HYPERLIPIDEMIA: ICD-10-CM

## 2025-07-01 DIAGNOSIS — F41.1 GENERALIZED ANXIETY DISORDER: ICD-10-CM

## 2025-07-01 DIAGNOSIS — I10 ESSENTIAL HYPERTENSION: ICD-10-CM

## 2025-07-01 PROCEDURE — 3008F BODY MASS INDEX DOCD: CPT | Performed by: FAMILY MEDICINE

## 2025-07-01 PROCEDURE — 3044F HG A1C LEVEL LT 7.0%: CPT | Performed by: FAMILY MEDICINE

## 2025-07-01 PROCEDURE — 3074F SYST BP LT 130 MM HG: CPT | Performed by: FAMILY MEDICINE

## 2025-07-01 PROCEDURE — 3061F NEG MICROALBUMINURIA REV: CPT | Performed by: FAMILY MEDICINE

## 2025-07-01 PROCEDURE — 99214 OFFICE O/P EST MOD 30 MIN: CPT | Performed by: FAMILY MEDICINE

## 2025-07-01 PROCEDURE — 3078F DIAST BP <80 MM HG: CPT | Performed by: FAMILY MEDICINE

## 2025-07-01 RX ORDER — ATORVASTATIN CALCIUM 10 MG/1
10 TABLET, FILM COATED ORAL
Qty: 36 TABLET | Refills: 0 | Status: SHIPPED | OUTPATIENT
Start: 2025-07-02

## 2025-07-01 RX ORDER — ESCITALOPRAM OXALATE 20 MG/1
20 TABLET ORAL DAILY
Qty: 90 TABLET | Refills: 0 | Status: SHIPPED | OUTPATIENT
Start: 2025-07-01

## 2025-07-01 RX ORDER — LOSARTAN POTASSIUM 100 MG/1
100 TABLET ORAL DAILY
Qty: 90 TABLET | Refills: 0 | Status: SHIPPED | OUTPATIENT
Start: 2025-07-01

## 2025-07-01 NOTE — PROGRESS NOTES
[facility] Office Note  Chief Complaint:   Chief Complaint   Patient presents with    Medication Follow-Up       HPI:   This is a 55 year old male coming in for medication follow-up.  The patient states that he has been taking his medications as prescribed he has not had any issues with the meds.  He does have a history of hyperlipidemia anxiety hypertension diet-controlled diabetes.      Past Medical History[1]  Past Surgical History[2]  Social History:  Short Social Hx on File[3]  Family History:  Family History[4]  Allergies:  Allergies[5]  Current Meds:  Current Medications[6]   Counseling given: Not Answered  Tobacco comments: had been 1-3 cigarettes per week       REVIEW OF SYSTEMS:   Consitutional: No fevers, chills, sweats  Eye: No recent visual problems  ENMT: No ear pain nasal congestion sore throat  Respiratory: No shortness of breath, cough  Cardiovascular: No chest pain palpitations syncope  Gastrointestinal: No nausea vomiting diarrhea  Genitourinary: No hematuria  Hema/Lymph no bruising tendency, swollen lymph glands       Medical, surgical, family, and social histories were reviewed      EXAM:   VITALS:   Vitals:    07/01/25 1638   BP: 116/70   Pulse: 66   Resp: 16   Temp: 98.5 °F (36.9 °C)      GENERAL: well developed, well nourished, in no apparent distress  SKIN: no rashes, no suspicious lesions: Cool and Dry  HEENT: atraumatic, normocephalic, ears and throat are clear    Ears: TM's clear and visible bilaterally, no excess cerumen or erythema.   EYES: Pupils equal round and reactive.  Extraocular motions intact no scleral icterus no injection or drainage  THROAT without erythema tonsillar hypertrophy or exudate.  Uvula midline airway patent  NECK: Given midline.  No JVD or lymphadenopathy supple nontender no meningeal signs   LUNGS: clear to auscultation sounds equal bilaterally no wheezes rales or rhonchi  CARDIO: Regular rate and rhythm without murmurs gallops or  rubs  GI: Soft nontender nondistended no hepatomegaly palpable masses.  No guarding.   without deformity or crepitus no flank tenderness  EXTREMITIES: no cyanosis, clubbing or edema no joint tenderness effusion or edema noted.  No calf tenderness negative Homans' sign bilaterally   Bilateral barefoot skin diabetic exam is normal, visualized feet and the appearance is normal.  Bilateral monofilament/sensation of both feet is normal.  Pulsation pedal pulse exam of both lower legs/feet is normal as well.       ASSESSMENT AND PLAN:   1. Mixed hyperlipidemia  - atorvastatin 10 MG Oral Tab; Take 1 tablet (10 mg total) by mouth 3 (three) times a week.  Dispense: 36 tablet; Refill: 0  - Comp Metabolic Panel (14); Future  - Hemoglobin A1C; Future  - Lipid Panel; Future  - Microalb/Creat Ratio, Random Urine; Future  I did discuss with the patient at this time I suggest him to update his blood work you need a CMP hemoglobin A1c lipid panel urine microalbumin  2. Generalized anxiety disorder  - escitalopram 20 MG Oral Tab; Take 1 tablet (20 mg total) by mouth daily.  Dispense: 90 tablet; Refill: 0  - Comp Metabolic Panel (14); Future  - Hemoglobin A1C; Future  - Lipid Panel; Future  - Microalb/Creat Ratio, Random Urine; Future  Patient's anxiety has been well-controlled he is currently on the Lexapro he was asked to continue the medication as prescribed  3. Essential hypertension  - losartan 100 MG Oral Tab; Take 1 tablet (100 mg total) by mouth daily.  Dispense: 90 tablet; Refill: 0  - Comp Metabolic Panel (14); Future  - Hemoglobin A1C; Future  - Lipid Panel; Future  - Microalb/Creat Ratio, Random Urine; Future  Did discuss with the patient his blood pressure has been well-controlled he is currently on losartan he was given refills of this today.  He has not had any headaches or chest pain.  4. Type 2 diabetes mellitus with hyperglycemia, without long-term current use of insulin (HCC)  - Comp Metabolic Panel (14);  Future  - Hemoglobin A1C; Future  - Lipid Panel; Future  - Microalb/Creat Ratio, Random Urine; Future       Meds & Refills for this Visit:  Requested Prescriptions     Signed Prescriptions Disp Refills    atorvastatin 10 MG Oral Tab 36 tablet 0     Sig: Take 1 tablet (10 mg total) by mouth 3 (three) times a week.    escitalopram 20 MG Oral Tab 90 tablet 0     Sig: Take 1 tablet (20 mg total) by mouth daily.    losartan 100 MG Oral Tab 90 tablet 0     Sig: Take 1 tablet (100 mg total) by mouth daily.       Health Maintenance:  Health Maintenance Due   Topic Date Due    Zoster Vaccines (1 of 2) Never done    Diabetes Care Dilated Eye Exam  08/07/2024    Diabetes Care Foot Exam  09/01/2024    COVID-19 Vaccine (7 - 2024-25 season) 09/01/2024    Annual Depression Screening  01/01/2025       Patient/Caregiver Education: Patient/Caregiver Education: There are no barriers to learning. Medical education done.   Outcome: Patient verbalizes understanding. Patient is notified to call with any questions, complications, allergies, or worsening or changing symptoms.  Patient is to call with any side effects or complications from the treatments as a result of today.     Problem List:  Problem List[7]      No follow-ups on file.     Tr Matat MD    Please note that portions of this note may have been completed with a voice recognition program. Efforts were made to edit the dictations but occasionally words are mis-transcribed.         [1]   Past Medical History:   Cryptic tonsil    Diabetes mellitus (HCC)    Essential hypertension    Gilbert's disease    in question    Hemorrhoids    High cholesterol    Impaired fasting glucose    Other abnormal glucose    Other and unspecified hyperlipidemia    and mixed hyperlipidemia    Panic disorder without agoraphobia    Pure hypercholesterolemia    Sciatica    Sleep disturbance   [2]   Past Surgical History:  Procedure Laterality Date    Appendectomy      peritonitis    Appendectomy      [3]   Social History  Socioeconomic History    Marital status: Single    Number of children: 0   Occupational History    Occupation: on-site management   Tobacco Use    Smoking status: Former     Current packs/day: 0.00     Types: Cigarettes     Start date: 5/3/2006     Quit date: 5/3/2014     Years since quittin.1     Passive exposure: Never    Smokeless tobacco: Never    Tobacco comments:     had been 1-3 cigarettes per week   Vaping Use    Vaping status: Never Used   Substance and Sexual Activity    Alcohol use: Yes     Alcohol/week: 3.0 standard drinks of alcohol     Types: 1 Glasses of wine, 1 Cans of beer, 1 Standard drinks or equivalent per week     Comment: socially ; any type    Drug use: No   Other Topics Concern    Caffeine Concern No     Comment: 3 of decaf. coffee every other day    Occupational Exposure No    Sleep Concern No    Stress Concern Yes     Comment: work related    Special Diet No     Comment: watching carbs/sugars    Exercise Yes     Comment: not lately    Seat Belt Yes   Social History Narrative    Has a dog.   [4]   Family History  Problem Relation Age of Onset    Diabetes Paternal Uncle     Diabetes Paternal Uncle     Diabetes Paternal Uncle     Diabetes Paternal Uncle     Diabetes Maternal Aunt     Diabetes Maternal Aunt     Diabetes Other         aunts,uncles    High Cholesterol Mother     Lipids Mother     High Cholesterol Brother     Arthritis Brother     Other (Hypotension[other]) Father         diet & exercise; no contact since    [5]   Allergies  Allergen Reactions    Propranolol RASH and SWELLING    Pravastatin      Sodium; weakness     [6]   Current Outpatient Medications   Medication Sig Dispense Refill    [START ON 2025] atorvastatin 10 MG Oral Tab Take 1 tablet (10 mg total) by mouth 3 (three) times a week. 36 tablet 0    escitalopram 20 MG Oral Tab Take 1 tablet (20 mg total) by mouth daily. 90 tablet 0    losartan 100 MG Oral Tab Take 1 tablet (100 mg  total) by mouth daily. 90 tablet 0    fluticasone propionate 50 MCG/ACT Nasal Suspension 2 sprays by Each Nare route daily. 15.8 mL 3    hydrOXYzine 10 MG Oral Tab TAKE 1-2 TABLETS BY MOUTH THREE TIMES A DAY AS NEEDED FOR ANXIETY 60 tablet 1    Glucose Blood (ONETOUCH VERIO) In Vitro Strip USE TO CHECK GLUCOSE DAILY 90 strip 1    ONETOUCH DELICA LANCETS FINE Does not apply Misc Test twice daily 2 Box 3   [7]   Patient Active Problem List  Diagnosis    Anxiety    Hypercholesterolemia    Prediabetes    Mixed hyperlipidemia    Essential hypertension    Type 2 diabetes mellitus with hyperglycemia, without long-term current use of insulin (HCC)    Special screening for malignant neoplasm of colon    Inflamed penis    Diet-controlled diabetes mellitus (HCC)    Venous insufficiency of both lower extremities    Varicose veins of both legs with edema

## (undated) DIAGNOSIS — F41.1 GENERALIZED ANXIETY DISORDER: ICD-10-CM

## (undated) NOTE — MR AVS SNAPSHOT
Nasima Suazo [address] [place] [phone]  [phone]               Thank you for choosing us for your health care visit with Melissa Nolen MD.  We are glad to serve you and happy to provide you with this Order:   Op Referral New Onset Diabetes- No Prev Education 10 Hrs          Referral Orders      Normal Orders This Visit    OP REFERRAL NEW ONSET DIABETES- NO PREV EDUCATION 10 HRS [[id number] CUSTOM]  Order #:  [id number]         Your physician has referred Recommend about 3-4 carb servings per meal and you can also have 1-2 servings for snacks as needed. Start metformin 500 mg, 1 pill daily with dinner for 2 weeks. If no bad nausea, diarrhea or abdominal pain, increase to 2 pill daily with dinner.     Rec the right balance of physical activity and medicine. The amount of carbohydrates needed will vary for each person. It depends on many things such as your health, the medicines you take, and how active you are.  Your healthcare team will help you figure out · Check the total grams of carbohydrates. Total carbohydrate from the label includes sugar, starch, and fiber. Be sure to use the total carbohydrate number and not sugar alone.   · Know how many grams of carbohydrates you can have.  Be familiar with the mat Sugars are found naturally in many foods. Or sugar can be added. Foods that contain natural sugar include fruits and fruit juices, dairy products, honey, and molasses.  Added sugars are found in most desserts, processed foods, candy, regular soda, and fruit · 1/4 of a large baked potato (3 ounces)  · 2/3 cup of plain fat-free yogurt  · 1 cup of soup  · 1/2 cup of casserole  · 6 chicken nuggets  · 2-inch-square brownie or cake without frosting  · 2 small cookies  · 1/2 cup of ice cream or sherbet  [employer] [address]. All rights reserved. This information is not intended as a substitute for professional medical care. Always follow your healthcare professional's instructions.              Allergies as of Apr 25, 2017     Pravastatin     Sodium; weakness Call [phone] for help. [employer] is NOT to be used for urgent needs. For medical emergencies, dial 911.            Visit [facility] online at  [email or web address]

## (undated) NOTE — MR AVS SNAPSHOT
Edgar Suazo [address] [place] [phone]               Thank you for choosing us for your health care visit with Tierra Smyth MD.  We are glad to serve you and happy to provide you with this · Try limiting chocolate, peppermint, and spearmint. These can worsen acid reflux in some people. Watch when you eat  · Avoid lying down for 3 hours after eating. · Do not snack before going to bed.   Raise your head  Raising your head and upper body by 4 Commonly known as:  PAXIL                Where to Get Your Medications      These medications were sent to [place] [address] [address] [place] [address], [place]  AT [address], [phone], [phone]  [facility] 0, [address] is best for hydration Fast food. Eat at home when possible     Tips for increasing your physical activity – Adults who are physically active are less likely to develop some chronic diseases than adults who are inactive.      HOW TO GET STARTED: HOW

## (undated) NOTE — LETTER
01/16/21        [facility]  [address] [place] [phone]      Dear Torsten Zheng,    [facility] records indicate that you have outstanding lab work and or testing that was ordered for you and has not yet been completed:  Orders Placed This Encounte

## (undated) NOTE — LETTER
06/09/20        Eliseo Pleasure  [address] [facility] [phone]      Dear Sabi Hicks,    [address] [facility] records indicate that you have outstanding lab work and or testing that was ordered for you and has not yet been completed:  Orders Placed This Encounte

## (undated) NOTE — MR AVS SNAPSHOT
[facility] [address]  [facility] [address]  [phone]               Thank you for choosing us for your health care visit with Karla Liz RN,CDE.   We are glad to serve you and happy to provide you with this summary Commonly known as:  GLUCOPHAGE-XR           ONETOUCH DELICA LANCETS FINE Misc   Test twice daily           ONETOUCH VERIO Strp   Generic drug:  Glucose Blood   TEST TWICE DAILY                Where to Get Your Medications      These medications were sent t

## (undated) NOTE — Clinical Note
04/12/2017        [facility]  [address] [place] [phone]      Dear Torsten Zheng,    [address] [facility] records indicate that you have outstanding lab work and or testing that was ordered for you and has not yet been completed:      CMP now  Lipids now

## (undated) NOTE — LETTER
03/27/18        Alise Rivas  [address] [facility] [phone]      Dear Jose A Wallace,    [address] records indicate that you have outstanding lab work and or testing that was ordered for you and has not yet been completed:          Comp Metabolic Pane

## (undated) NOTE — MR AVS SNAPSHOT
Edgar Suazo [address] [place] [phone]  [phone]               Thank you for choosing us for your health care visit with Tierra Smyth MD.  We are glad to serve you and happy to provide you with this · Starches are found in bread, cereals, pasta, and dried beans. They’re also found in corn, peas, potatoes, yam, acorn squash, and butternut squash.  Starches also raise blood sugar.   · Fiber is found in foods such as vegetables, fruits, beans, and whole g Protein  Protein helps the body build and repair muscle and other tissue. Protein has little or no effect on blood sugar. However, many foods that contain protein also contain saturated fat.  By choosing low-fat protein sources, you can get the benefits of of different foods). Choose lean meats, fresh fruits and vegetables, whole grains, and low-fat or nonfat dairy products. Eating a wide variety of foods provides the nutrients your body needs. It can also keep you from getting bored with your meal plan.   Sara Love until late in the day to get most of your calories. Doing so can cause your blood sugar to rise too high or fall too low. Date Last Reviewed: 3/1/2016  © 7770-3685 The [facility] [place], [address] [place] [address] [place]. All rights reserved. includes vegetable oil, mayonnaise, soft margarine, salad dressing, nuts, olives, avocados, and some fish). · Limit the extras (solid fats and sugars, also called \"empty calories\") to 130 calories a day. · Cut back on salt (sodium).  Stay under 2,300 mg 1 ounce cooked chicken or turkey (no skin)  1 ounce cooked fish or shellfish (not fried)  1 egg  ¼ cup egg substitute  ½ ounce nuts or seeds  1 tablespoon peanut or almond butter  ¼ cup cooked dry beans or peas  ½ cup tofu  2 tablespoons hummus     Date La and poultry choices should be lean or low fat. · Dairy. All fluid milk products and foods made from milk that contain calcium, like yogurt and cheese are part of the [employer].  (Foods that have little calcium, such as cream, butter, and cream cheese, ar · 4 cups other vegetables Eat a variety of fruits each day. Go easy on fruit juices. Good choices of fruits include:  · Berries  · Bananas  · Apples  · Melon  · Dry fruit  · Frozen fruit  · Canned fruit Choose whole grains whenever you can.   Aim to eat a Food Group  Daily MyPlate Goal  What You Ate Today    Vegetables 5 Half-cups or 5 Servings  One serving is:  ½ cup cut-up raw or cooked vegetables  1 cup raw, leafy vegetables  ½ baked sweet potato  ½ cup vegetable juice  Note: At meals, fill half your vashti ALPRAZolam 0.5 MG Tabs   Take 0.5-1 tablets (0.25-0.5 mg total) by mouth nightly as needed for Anxiety.    Commonly known as:  XANAX           atorvastatin 10 MG Tabs   TAKE 1 TABLET BY MOUTH EVERY DAY   Commonly known as:  LIPITOR           escitalopram 1